# Patient Record
Sex: MALE | Race: WHITE | NOT HISPANIC OR LATINO | Employment: FULL TIME | URBAN - NONMETROPOLITAN AREA
[De-identification: names, ages, dates, MRNs, and addresses within clinical notes are randomized per-mention and may not be internally consistent; named-entity substitution may affect disease eponyms.]

---

## 2021-06-18 ENCOUNTER — HOSPITAL ENCOUNTER (EMERGENCY)
Facility: HOSPITAL | Age: 31
Discharge: HOME | End: 2021-06-18
Attending: EMERGENCY MEDICINE | Admitting: EMERGENCY MEDICINE

## 2021-06-18 VITALS
SYSTOLIC BLOOD PRESSURE: 142 MMHG | HEIGHT: 67 IN | RESPIRATION RATE: 18 BRPM | WEIGHT: 162 LBS | DIASTOLIC BLOOD PRESSURE: 79 MMHG | OXYGEN SATURATION: 95 % | HEART RATE: 95 BPM | TEMPERATURE: 98.2 F | BODY MASS INDEX: 25.43 KG/M2

## 2021-06-18 DIAGNOSIS — F10.920 ALCOHOLIC INTOXICATION WITHOUT COMPLICATION (HCC): Primary | ICD-10-CM

## 2021-06-18 LAB
ALBUMIN SERPL-MCNC: 5 G/DL (ref 3.5–5.2)
ALBUMIN/GLOB SERPL: 1.6 G/DL
ALP SERPL-CCNC: 84 U/L (ref 39–117)
ALT SERPL W P-5'-P-CCNC: 77 U/L (ref 1–41)
ANION GAP SERPL CALCULATED.3IONS-SCNC: 19 MMOL/L (ref 5–15)
AST SERPL-CCNC: 101 U/L (ref 1–40)
BASOPHILS # BLD AUTO: 0.08 10*3/MM3 (ref 0–0.2)
BASOPHILS NFR BLD AUTO: 1 % (ref 0–1.5)
BILIRUB SERPL-MCNC: 0.8 MG/DL (ref 0–1.2)
BUN SERPL-MCNC: 9 MG/DL (ref 6–20)
BUN/CREAT SERPL: 12.2 (ref 7–25)
CALCIUM SPEC-SCNC: 9.6 MG/DL (ref 8.6–10.5)
CHLORIDE SERPL-SCNC: 96 MMOL/L (ref 98–107)
CO2 SERPL-SCNC: 23 MMOL/L (ref 22–29)
CREAT SERPL-MCNC: 0.74 MG/DL (ref 0.76–1.27)
DEPRECATED RDW RBC AUTO: 40.8 FL (ref 37–54)
EOSINOPHIL # BLD AUTO: 0.01 10*3/MM3 (ref 0–0.4)
EOSINOPHIL NFR BLD AUTO: 0.1 % (ref 0.3–6.2)
ERYTHROCYTE [DISTWIDTH] IN BLOOD BY AUTOMATED COUNT: 13.2 % (ref 12.3–15.4)
ETHANOL UR QL: 0.26 %
GFR SERPL CREATININE-BSD FRML MDRD: 126 ML/MIN/1.73
GLOBULIN UR ELPH-MCNC: 3.1 GM/DL
GLUCOSE SERPL-MCNC: 102 MG/DL (ref 65–99)
HCT VFR BLD AUTO: 44.5 % (ref 37.5–51)
HGB BLD-MCNC: 15.4 G/DL (ref 13–17.7)
IMM GRANULOCYTES # BLD AUTO: 0.02 10*3/MM3 (ref 0–0.05)
IMM GRANULOCYTES NFR BLD AUTO: 0.2 % (ref 0–0.5)
LIPASE SERPL-CCNC: 23 U/L (ref 13–60)
LYMPHOCYTES # BLD AUTO: 2.12 10*3/MM3 (ref 0.7–3.1)
LYMPHOCYTES NFR BLD AUTO: 25.9 % (ref 19.6–45.3)
MCH RBC QN AUTO: 29.1 PG (ref 26.6–33)
MCHC RBC AUTO-ENTMCNC: 34.6 G/DL (ref 31.5–35.7)
MCV RBC AUTO: 84.1 FL (ref 79–97)
MONOCYTES # BLD AUTO: 0.67 10*3/MM3 (ref 0.1–0.9)
MONOCYTES NFR BLD AUTO: 8.2 % (ref 5–12)
NEUTROPHILS NFR BLD AUTO: 5.28 10*3/MM3 (ref 1.7–7)
NEUTROPHILS NFR BLD AUTO: 64.6 % (ref 42.7–76)
NRBC BLD AUTO-RTO: 0 /100 WBC (ref 0–0.2)
PLATELET # BLD AUTO: 332 10*3/MM3 (ref 140–450)
PMV BLD AUTO: 8.8 FL (ref 6–12)
POTASSIUM SERPL-SCNC: 3.5 MMOL/L (ref 3.5–5.2)
PROT SERPL-MCNC: 8.1 G/DL (ref 6–8.5)
RBC # BLD AUTO: 5.29 10*6/MM3 (ref 4.14–5.8)
SODIUM SERPL-SCNC: 138 MMOL/L (ref 136–145)
WBC # BLD AUTO: 8.18 10*3/MM3 (ref 3.4–10.8)

## 2021-06-18 PROCEDURE — 85025 COMPLETE CBC W/AUTO DIFF WBC: CPT | Performed by: EMERGENCY MEDICINE

## 2021-06-18 PROCEDURE — 80053 COMPREHEN METABOLIC PANEL: CPT | Performed by: EMERGENCY MEDICINE

## 2021-06-18 PROCEDURE — 25010000002 LORAZEPAM PER 2 MG: Performed by: EMERGENCY MEDICINE

## 2021-06-18 PROCEDURE — 96374 THER/PROPH/DIAG INJ IV PUSH: CPT

## 2021-06-18 PROCEDURE — 83690 ASSAY OF LIPASE: CPT | Performed by: EMERGENCY MEDICINE

## 2021-06-18 PROCEDURE — 99283 EMERGENCY DEPT VISIT LOW MDM: CPT

## 2021-06-18 PROCEDURE — 82077 ASSAY SPEC XCP UR&BREATH IA: CPT | Performed by: EMERGENCY MEDICINE

## 2021-06-18 RX ORDER — LORAZEPAM 2 MG/ML
1 INJECTION INTRAMUSCULAR ONCE
Status: COMPLETED | OUTPATIENT
Start: 2021-06-18 | End: 2021-06-18

## 2021-06-18 RX ORDER — SODIUM CHLORIDE 0.9 % (FLUSH) 0.9 %
10 SYRINGE (ML) INJECTION AS NEEDED
Status: DISCONTINUED | OUTPATIENT
Start: 2021-06-18 | End: 2021-06-19 | Stop reason: HOSPADM

## 2021-06-18 RX ADMIN — SODIUM CHLORIDE 1000 ML: 9 INJECTION, SOLUTION INTRAVENOUS at 22:06

## 2021-06-18 RX ADMIN — LORAZEPAM 1 MG: 2 INJECTION INTRAMUSCULAR; INTRAVENOUS at 22:27

## 2021-06-19 NOTE — ED PROVIDER NOTES
Subjective   Patient presents with a complaint that he thinks he may be going through withdrawal.  He says he has been an alcoholic for a long time and only drinks pretty much every day.  He says he can drink as much as 1/5 a day but normally drinks a little less than that.  However he is decided he needs to quit because he is no he is doing himself always been lying to his family.  They have finally convinced him to come in to get checked out and get treated.  He says his last drink was a shot subtype of whiskey about 4 hours prior to arrival here.  However when asked about that shot he says it is actually a double shot.  He says he is feeling very anxious and worried and does not want to die this disease of alcoholism.      History provided by:  Patient   used: No    Alcohol Intoxication  Location:  General  Quality:  Alcohol  Severity:  Moderate  Onset quality:  Gradual  Duration:  4 hours  Timing:  Constant  Progression:  Unchanged  Chronicity:  New  Associated symptoms: no abdominal pain, no chest pain, no congestion, no cough, no diarrhea, no ear pain, no fever, no headaches, no myalgias, no nausea, no rhinorrhea, no shortness of breath and no vomiting        Review of Systems   Constitutional: Negative.  Negative for fever.   HENT: Negative.  Negative for congestion, ear pain and rhinorrhea.    Respiratory: Negative.  Negative for cough and shortness of breath.    Cardiovascular: Negative.  Negative for chest pain.   Gastrointestinal: Negative.  Negative for abdominal pain, diarrhea, nausea and vomiting.   Genitourinary: Negative.    Musculoskeletal: Negative.  Negative for myalgias.   Skin: Negative.    Neurological: Negative.  Negative for headaches.   Psychiatric/Behavioral: Negative.    All other systems reviewed and are negative.      No past medical history on file.    No Known Allergies    No past surgical history on file.    No family history on file.    Social History      Socioeconomic History   • Marital status: Significant Other     Spouse name: Not on file   • Number of children: Not on file   • Years of education: Not on file   • Highest education level: Not on file           Objective   Physical Exam  Vitals and nursing note reviewed.   Constitutional:       Appearance: Normal appearance.   HENT:      Head: Normocephalic and atraumatic.      Mouth/Throat:      Mouth: Mucous membranes are moist.      Pharynx: Oropharynx is clear.   Eyes:      Extraocular Movements: Extraocular movements intact.      Pupils: Pupils are equal, round, and reactive to light.   Cardiovascular:      Rate and Rhythm: Regular rhythm. Tachycardia present.   Pulmonary:      Effort: Pulmonary effort is normal.      Breath sounds: Normal breath sounds.   Abdominal:      General: Abdomen is flat.      Palpations: Abdomen is soft.   Musculoskeletal:         General: Normal range of motion.      Cervical back: Normal range of motion and neck supple.   Skin:     General: Skin is warm and dry.   Neurological:      General: No focal deficit present.      Mental Status: He is alert and oriented to person, place, and time.      Comments: He has no signs of tremors or seizure activity.   Psychiatric:         Mood and Affect: Mood normal.         Behavior: Behavior normal.      Comments: Patient has no signs of visual auditory hallucinations.         Procedures           ED Course  ED Course as of Jun 19 0435   Sat Jun 19, 2021   0771 I told the patient his testing looks okay other than his liver enzymes are up a little bit but he will stop drinking that should correct himself.  I pointed out to him that his alcohol level was 0.257 at the present time and that was several hours after what he claimed his last drink of only a shot and then before that it was sometime in the morning.  I told him that he may be drinking more than he realizes or has admitted himself.  However there is no treatment program here to help  him with alcoholism and there is none anywhere nearby that I know of except for a program in [place].  He can check with him if he wants to an inpatient program or outpatient program.  I applauded him on his awareness that he needed to quit drinking and I have told him to follow-up with these people try to get some help.  He does say that his family has a good strong support and his wife is here with him so they can have them out also.  He is discharged in stable condition.  There are no signs of impending DTs especially without alcohol level.    [TR]      ED Course User Index  [TR] Corby Esqueda Jr., MD                                           MDM  Number of Diagnoses or Management Options  Alcoholic intoxication without complication (CMS/HCC): new and requires workup     Amount and/or Complexity of Data Reviewed  Clinical lab tests: ordered and reviewed  Tests in the radiology section of CPT®: ordered and reviewed  Tests in the medicine section of CPT®: ordered and reviewed    Risk of Complications, Morbidity, and/or Mortality  Presenting problems: moderate  Diagnostic procedures: moderate  Management options: moderate    Patient Progress  Patient progress: stable      Final diagnoses:   Alcoholic intoxication without complication (CMS/HCC)       ED Disposition  ED Disposition     ED Disposition Condition Comment    Discharge Stable           Provider, No Known  [facility] [address]  [phone]      If symptoms worsen         Medication List      No changes were made to your prescriptions during this visit.          Corby Esqueda Jr., MD  06/19/21 2955

## 2021-06-19 NOTE — ED NOTES
Patient presents to the ED with the CC of alcohol withdraw. Last drink @ 1607. He states that for the past month and half he has been drinking daily, all day. He states it is due to stress. He admits to smoke marijuana today to try to settle his nerves, denies any stimulant use.      Velma Holcomb, RN  06/18/21 2056

## 2022-03-29 ENCOUNTER — HOSPITAL ENCOUNTER (EMERGENCY)
Facility: HOSPITAL | Age: 32
Discharge: HOME | End: 2022-03-30
Attending: INTERNAL MEDICINE | Admitting: INTERNAL MEDICINE

## 2022-03-29 DIAGNOSIS — K92.0 HEMATEMESIS WITH NAUSEA: Primary | ICD-10-CM

## 2022-03-29 DIAGNOSIS — F10.920 ALCOHOLIC INTOXICATION WITHOUT COMPLICATION: ICD-10-CM

## 2022-03-29 PROCEDURE — 96374 THER/PROPH/DIAG INJ IV PUSH: CPT

## 2022-03-29 PROCEDURE — 99283 EMERGENCY DEPT VISIT LOW MDM: CPT

## 2022-03-29 PROCEDURE — 96361 HYDRATE IV INFUSION ADD-ON: CPT

## 2022-03-29 PROCEDURE — 82077 ASSAY SPEC XCP UR&BREATH IA: CPT | Performed by: INTERNAL MEDICINE

## 2022-03-29 PROCEDURE — 36415 COLL VENOUS BLD VENIPUNCTURE: CPT

## 2022-03-29 PROCEDURE — 85025 COMPLETE CBC W/AUTO DIFF WBC: CPT | Performed by: INTERNAL MEDICINE

## 2022-03-29 PROCEDURE — 80053 COMPREHEN METABOLIC PANEL: CPT | Performed by: INTERNAL MEDICINE

## 2022-03-29 RX ORDER — SODIUM CHLORIDE 0.9 % (FLUSH) 0.9 %
10 SYRINGE (ML) INJECTION AS NEEDED
Status: DISCONTINUED | OUTPATIENT
Start: 2022-03-29 | End: 2022-03-30 | Stop reason: HOSPADM

## 2022-03-29 RX ORDER — FAMOTIDINE 10 MG/ML
20 INJECTION, SOLUTION INTRAVENOUS ONCE
Status: COMPLETED | OUTPATIENT
Start: 2022-03-29 | End: 2022-03-30

## 2022-03-30 VITALS
OXYGEN SATURATION: 99 % | BODY MASS INDEX: 26.68 KG/M2 | RESPIRATION RATE: 18 BRPM | HEART RATE: 91 BPM | TEMPERATURE: 97.7 F | SYSTOLIC BLOOD PRESSURE: 123 MMHG | WEIGHT: 170 LBS | HEIGHT: 67 IN | DIASTOLIC BLOOD PRESSURE: 87 MMHG

## 2022-03-30 LAB
ALBUMIN SERPL-MCNC: 4.9 G/DL (ref 3.5–5.2)
ALBUMIN/GLOB SERPL: 1.7 G/DL
ALP SERPL-CCNC: 72 U/L (ref 39–117)
ALT SERPL W P-5'-P-CCNC: 22 U/L (ref 1–41)
ANION GAP SERPL CALCULATED.3IONS-SCNC: 12 MMOL/L (ref 5–15)
AST SERPL-CCNC: 26 U/L (ref 1–40)
BASOPHILS # BLD AUTO: 0.08 10*3/MM3 (ref 0–0.2)
BASOPHILS NFR BLD AUTO: 0.8 % (ref 0–1.5)
BILIRUB SERPL-MCNC: 0.3 MG/DL (ref 0–1.2)
BUN SERPL-MCNC: 13 MG/DL (ref 6–20)
BUN/CREAT SERPL: 16.5 (ref 7–25)
CALCIUM SPEC-SCNC: 9 MG/DL (ref 8.6–10.5)
CHLORIDE SERPL-SCNC: 99 MMOL/L (ref 98–107)
CO2 SERPL-SCNC: 27 MMOL/L (ref 22–29)
CREAT SERPL-MCNC: 0.79 MG/DL (ref 0.76–1.27)
DEPRECATED RDW RBC AUTO: 38.3 FL (ref 37–54)
EGFRCR SERPLBLD CKD-EPI 2021: 123.3 ML/MIN/1.73
EOSINOPHIL # BLD AUTO: 0.06 10*3/MM3 (ref 0–0.4)
EOSINOPHIL NFR BLD AUTO: 0.6 % (ref 0.3–6.2)
ERYTHROCYTE [DISTWIDTH] IN BLOOD BY AUTOMATED COUNT: 12.4 % (ref 12.3–15.4)
ETHANOL UR QL: 0.29 %
GLOBULIN UR ELPH-MCNC: 2.9 GM/DL
GLUCOSE SERPL-MCNC: 111 MG/DL (ref 65–99)
HCT VFR BLD AUTO: 46.5 % (ref 37.5–51)
HGB BLD-MCNC: 15.9 G/DL (ref 13–17.7)
HOLD SPECIMEN: NORMAL
HOLD SPECIMEN: NORMAL
IMM GRANULOCYTES # BLD AUTO: 0.07 10*3/MM3 (ref 0–0.05)
IMM GRANULOCYTES NFR BLD AUTO: 0.7 % (ref 0–0.5)
INR PPP: 1.05 (ref 0.91–1.09)
LYMPHOCYTES # BLD AUTO: 2.41 10*3/MM3 (ref 0.7–3.1)
LYMPHOCYTES NFR BLD AUTO: 23 % (ref 19.6–45.3)
MCH RBC QN AUTO: 29 PG (ref 26.6–33)
MCHC RBC AUTO-ENTMCNC: 34.2 G/DL (ref 31.5–35.7)
MCV RBC AUTO: 84.7 FL (ref 79–97)
MONOCYTES # BLD AUTO: 0.64 10*3/MM3 (ref 0.1–0.9)
MONOCYTES NFR BLD AUTO: 6.1 % (ref 5–12)
NEUTROPHILS NFR BLD AUTO: 68.8 % (ref 42.7–76)
NEUTROPHILS NFR BLD AUTO: 7.2 10*3/MM3 (ref 1.7–7)
NRBC BLD AUTO-RTO: 0 /100 WBC (ref 0–0.2)
PLATELET # BLD AUTO: 372 10*3/MM3 (ref 140–450)
PMV BLD AUTO: 9.4 FL (ref 6–12)
POTASSIUM SERPL-SCNC: 4.3 MMOL/L (ref 3.5–5.2)
PROT SERPL-MCNC: 7.8 G/DL (ref 6–8.5)
PROTHROMBIN TIME: 13.3 SECONDS (ref 11.9–14.6)
RBC # BLD AUTO: 5.49 10*6/MM3 (ref 4.14–5.8)
SODIUM SERPL-SCNC: 138 MMOL/L (ref 136–145)
WBC NRBC COR # BLD: 10.46 10*3/MM3 (ref 3.4–10.8)
WHOLE BLOOD HOLD SPECIMEN: NORMAL
WHOLE BLOOD HOLD SPECIMEN: NORMAL

## 2022-03-30 PROCEDURE — 96374 THER/PROPH/DIAG INJ IV PUSH: CPT

## 2022-03-30 PROCEDURE — 96361 HYDRATE IV INFUSION ADD-ON: CPT

## 2022-03-30 PROCEDURE — 85610 PROTHROMBIN TIME: CPT | Performed by: INTERNAL MEDICINE

## 2022-03-30 RX ADMIN — SODIUM CHLORIDE 2313 ML: 9 INJECTION, SOLUTION INTRAVENOUS at 00:27

## 2022-03-30 RX ADMIN — FAMOTIDINE 20 MG: 10 INJECTION INTRAVENOUS at 00:29

## 2022-04-12 ENCOUNTER — OFFICE VISIT (OUTPATIENT)
Dept: FAMILY MEDICINE CLINIC | Facility: CLINIC | Age: 32
End: 2022-04-12

## 2022-04-12 VITALS
WEIGHT: 179 LBS | BODY MASS INDEX: 28.09 KG/M2 | SYSTOLIC BLOOD PRESSURE: 130 MMHG | DIASTOLIC BLOOD PRESSURE: 80 MMHG | OXYGEN SATURATION: 100 % | HEART RATE: 90 BPM | TEMPERATURE: 98 F | HEIGHT: 67 IN

## 2022-04-12 DIAGNOSIS — B37.0 THRUSH: ICD-10-CM

## 2022-04-12 DIAGNOSIS — F10.10 ALCOHOL ABUSE: ICD-10-CM

## 2022-04-12 DIAGNOSIS — K21.9 GASTROESOPHAGEAL REFLUX DISEASE, UNSPECIFIED WHETHER ESOPHAGITIS PRESENT: Primary | ICD-10-CM

## 2022-04-12 DIAGNOSIS — F41.1 GAD (GENERALIZED ANXIETY DISORDER): ICD-10-CM

## 2022-04-12 PROCEDURE — 99204 OFFICE O/P NEW MOD 45 MIN: CPT | Performed by: FAMILY MEDICINE

## 2022-04-12 RX ORDER — OMEPRAZOLE 40 MG/1
40 CAPSULE, DELAYED RELEASE ORAL DAILY
Qty: 90 CAPSULE | Refills: 1 | Status: SHIPPED | OUTPATIENT
Start: 2022-04-12 | End: 2022-11-04

## 2022-04-12 RX ORDER — FLUOXETINE HYDROCHLORIDE 20 MG/1
20 CAPSULE ORAL DAILY
Qty: 30 CAPSULE | Refills: 1 | Status: SHIPPED | OUTPATIENT
Start: 2022-04-12 | End: 2022-05-23

## 2022-04-12 NOTE — PROGRESS NOTES
"Chief Complaint  Establish Care ([facility] ED 3/29/22, dx: hematemesis), Alcohol Problem (Wanting to discuss ), and Anxiety (ARMIDA-7= 8, PHQ-1 )    Subjective          Gui Rivera presents to [facility]  History of Present Illness  No further hematemesis since ED visit above  Struggles with GERD  Hx ETOH abuse, now does not drink daily, will drink 4-5 drinks at social outings  Denies daily withdraws  Struggles with mild underlying anxiety, some depression on weekends  ROS otherwise neg    Objective   Vital Signs:   /80 (BP Location: Left arm, Patient Position: Sitting, Cuff Size: Adult)   Pulse 90   Temp 98 °F (36.7 °C) (Temporal)   Ht 170.2 cm (67\")   Wt 81.2 kg (179 lb)   SpO2 100%   BMI 28.04 kg/m²            Physical Exam  Vitals and nursing note reviewed.   Constitutional:       General: He is not in acute distress.     Appearance: He is not diaphoretic.   HENT:      Head: Normocephalic and atraumatic.      Nose: Nose normal.   Eyes:      General: No scleral icterus.        Right eye: No discharge.         Left eye: No discharge.      Conjunctiva/sclera: Conjunctivae normal.   Neck:      Trachea: No tracheal deviation.   Pulmonary:      Effort: Pulmonary effort is normal.   Skin:     General: Skin is warm and dry.      Coloration: Skin is not pale.   Neurological:      Mental Status: He is alert and oriented to person, place, and time.   Psychiatric:         Behavior: Behavior normal.         Thought Content: Thought content normal.         Judgment: Judgment normal.      Comments: Nervous without ETOH tremor        Result Review :                 Assessment and Plan    Diagnoses and all orders for this visit:    1. Gastroesophageal reflux disease, unspecified whether esophagitis present (Primary)  -     omeprazole (priLOSEC) 40 MG capsule; Take 1 capsule by mouth Daily.  Dispense: 90 capsule; Refill: 1    2. Thrush  -     nystatin (MYCOSTATIN) 100,000 unit/mL suspension; " Swish and swallow 5 mL 4 (Four) Times a Day.  Dispense: 473 mL; Refill: 0    3. Alcohol abuse    4. ARMIDA (generalized anxiety disorder)  -     FLUoxetine (PROzac) 20 MG capsule; Take 1 capsule by mouth Daily.  Dispense: 30 capsule; Refill: 1    consider counseling at f/u    Follow Up   Return in about 6 weeks (around 5/24/2022).  Patient was given instructions and counseling regarding his condition or for health maintenance advice. Please see specific information pulled into the AVS if appropriate.

## 2022-04-13 ENCOUNTER — PATIENT ROUNDING (BHMG ONLY) (OUTPATIENT)
Dept: FAMILY MEDICINE CLINIC | Facility: CLINIC | Age: 32
End: 2022-04-13

## 2022-04-13 NOTE — PROGRESS NOTES
April 13, 2022    Hello, may I speak with Gui Rivera?    My name is Liv    I am  with [facility]  [address] [place] [id number] [phone].    Before we get started may I verify your date of birth? 1990    I am calling to officially welcome you to our practice and ask about your recent visit. Is this a good time to talk? yes    Tell me about your visit with us. What things went well?  helpful calm his nerves/anxiety       We're always looking for ways to make our patients' experiences even better. Do you have recommendations on ways we may improve?  no    Overall were you satisfied with your first visit to our practice? yes       I appreciate you taking the time to speak with me today. Is there anything else I can do for you? no      Thank you, and have a great day.

## 2022-05-06 ENCOUNTER — NURSE TRIAGE (OUTPATIENT)
Dept: CALL CENTER | Facility: HOSPITAL | Age: 32
End: 2022-05-06

## 2022-05-07 NOTE — TELEPHONE ENCOUNTER
Reason for Disposition  • Caller has medicine question, adult has minor symptoms, caller declines triage, AND triager answers question    Additional Information  • Negative: [1] Intentional drug overdose AND [2] suicidal thoughts or ideas  • Negative: Drug overdose and triager unable to answer question  • Negative: Caller requesting information unrelated to medicine  • Negative: Caller requesting information about COVID-19 Vaccine  • Negative: Caller requesting information about Emergency Contraception  • Negative: Caller requesting information about Combined Birth Control Pills  • Negative: Caller requesting information about Progestin Birth Control Pills  • Negative: Caller requesting information about Post-Op pain or medicines  • Negative: Caller requesting a prescription antibiotic (such as Penicillin) for Strep throat and has a positive culture result  • Negative: Caller requesting a prescription anti-viral med (such as Tamiflu) and has influenza (flu)  symptoms  • Negative: Immunization reaction suspected  • Negative: Rash while taking a medicine or within 3 days of stopping it  • Negative: [1] Asthma and [2] having symptoms of asthma (cough, wheezing, etc.)  • Negative: [1] Symptom of illness (e.g., headache, abdominal pain, earache, vomiting) AND [2] more than mild  • Negative: Breastfeeding questions about mother's medicines and diet  • Negative: MORE THAN A DOUBLE DOSE of a prescription or over-the-counter (OTC) drug  • Negative: [1] DOUBLE DOSE (an extra dose or lesser amount) of prescription drug AND [2] any symptoms (e.g., dizziness, nausea, pain, sleepiness)  • Negative: [1] DOUBLE DOSE (an extra dose or lesser amount) of over-the-counter (OTC) drug AND [2] any symptoms (e.g., dizziness, nausea, pain, sleepiness)  • Negative: Took another person's prescription drug  • Negative: [1] DOUBLE DOSE (an extra dose or lesser amount) of prescription drug AND [2] NO symptoms (Exception: a double dose of  antibiotics)  • Negative: Diabetes drug error or overdose (e.g., took wrong type of insulin or took extra dose)  • Negative: [1] Prescription refill request for ESSENTIAL medicine (i.e., likelihood of harm to patient if not taken) AND [2] triager unable to refill per department policy  • Negative: [1] Prescription not at pharmacy AND [2] was prescribed by PCP recently (Exception: triager has access to EMR and prescription is recorded there. Go to Home Care and confirm for pharmacy.)  • Negative: [1] Pharmacy calling with prescription question AND [2] triager unable to answer question  • Negative: [1] Caller has URGENT medicine question about med that PCP or specialist prescribed AND [2] triager unable to answer question  • Negative: Medicine patch causing local rash or itching  • Negative: [1] Caller has medicine question about med NOT prescribed by PCP AND [2] triager unable to answer question (e.g., compatibility with other med, storage)  • Negative: Prescription request for new medicine (not a refill)  • Negative: Prescription refill request for a CONTROLLED substance (e.g., narcotics, ADHD medicines)  • Negative: [1] Prescription refill request for NON-ESSENTIAL medicine (i.e., no harm to patient if med not taken) AND [2] triager unable to refill per department policy  • Negative: [1] Caller has NON-URGENT medicine question about med that PCP prescribed AND [2] triager unable to answer question  • Negative: Caller wants to use a complementary or alternative medicine  • Negative: [1] Prescription prescribed recently is not at pharmacy AND [2] triager has access to patient's EMR AND [3] prescription is recorded in the EMR  • Negative: [1] DOUBLE DOSE (an extra dose or lesser amount) of over-the-counter (OTC) drug AND [2] NO symptoms  • Negative: [1] DOUBLE DOSE (an extra dose or lesser amount) of antibiotic drug AND [2] NO symptoms  • Negative: Caller has medicine question only, adult not sick, AND triager answers  "question    Answer Assessment - Initial Assessment Questions  1. NAME of MEDICATION: \"What medicine are you calling about?\"      Prozac and Protonix.   2. QUESTION: \"What is your question?\" (e.g., medication refill, side effect)      He forgot to take this morning and asking can he take now.  Advised yes.    3. PRESCRIBING HCP: \"Who prescribed it?\" Reason: if prescribed by specialist, call should be referred to that group.      Alberto Stephens,    4. SYMPTOMS: \"Do you have any symptoms?\"      No   5. SEVERITY: If symptoms are present, ask \"Are they mild, moderate or severe?\"      non  6. PREGNANCY:  \"Is there any chance that you are pregnant?\" \"When was your last menstrual period?\"      Male    Protocols used: MEDICATION QUESTION CALL-ADULT-AH      "

## 2022-05-10 ENCOUNTER — NURSE TRIAGE (OUTPATIENT)
Dept: CALL CENTER | Facility: HOSPITAL | Age: 32
End: 2022-05-10

## 2022-05-23 ENCOUNTER — OFFICE VISIT (OUTPATIENT)
Dept: FAMILY MEDICINE CLINIC | Facility: CLINIC | Age: 32
End: 2022-05-23

## 2022-05-23 VITALS
SYSTOLIC BLOOD PRESSURE: 148 MMHG | DIASTOLIC BLOOD PRESSURE: 86 MMHG | HEART RATE: 107 BPM | OXYGEN SATURATION: 98 % | HEIGHT: 66 IN | BODY MASS INDEX: 28.03 KG/M2 | WEIGHT: 174.4 LBS | TEMPERATURE: 97 F

## 2022-05-23 DIAGNOSIS — B37.0 THRUSH: ICD-10-CM

## 2022-05-23 DIAGNOSIS — F41.1 GAD (GENERALIZED ANXIETY DISORDER): Primary | ICD-10-CM

## 2022-05-23 PROCEDURE — 99214 OFFICE O/P EST MOD 30 MIN: CPT | Performed by: FAMILY MEDICINE

## 2022-05-23 RX ORDER — BUPROPION HYDROCHLORIDE 150 MG/1
150 TABLET ORAL EVERY MORNING
Qty: 30 TABLET | Refills: 1 | Status: SHIPPED | OUTPATIENT
Start: 2022-05-23 | End: 2022-07-26 | Stop reason: SDUPTHER

## 2022-05-23 RX ORDER — FLUCONAZOLE 150 MG/1
150 TABLET ORAL ONCE
Qty: 1 TABLET | Refills: 1 | Status: SHIPPED | OUTPATIENT
Start: 2022-05-23 | End: 2022-05-23

## 2022-05-23 NOTE — PROGRESS NOTES
"Chief Complaint  Follow-up and Anxiety (Discontinued prozac due to side effects, wanting to discuss other options )    Subjective          Gui Rivera presents to [facility]  History of Present Illness  Had to stop Prozac due to agitation, and when he was experiencing anxiety he felt like it was worse with increased severity.  Symptoms have improved with cessation of medication, but baseline anxiety is still persistent.    Feels like he still has thrush from previous visit despite use of nystatin suspension.    Objective   Vital Signs:  /86 (BP Location: Left arm, Patient Position: Sitting, Cuff Size: Adult)   Pulse 107   Temp 97 °F (36.1 °C) (Temporal)   Ht 167.6 cm (66\")   Wt 79.1 kg (174 lb 6.4 oz)   SpO2 98%   BMI 28.15 kg/m²         Physical Exam  Vitals and nursing note reviewed.   Constitutional:       General: He is not in acute distress.     Appearance: He is not diaphoretic.   HENT:      Head: Normocephalic and atraumatic.      Nose: Nose normal.   Eyes:      General: No scleral icterus.        Right eye: No discharge.         Left eye: No discharge.      Conjunctiva/sclera: Conjunctivae normal.   Neck:      Trachea: No tracheal deviation.   Pulmonary:      Effort: Pulmonary effort is normal.   Skin:     General: Skin is warm and dry.      Coloration: Skin is not pale.      Comments: Mild thrush   Neurological:      Mental Status: He is alert and oriented to person, place, and time.   Psychiatric:         Behavior: Behavior normal.         Thought Content: Thought content normal.         Judgment: Judgment normal.      Comments: Anxious        Result Review :                 Assessment and Plan    Diagnoses and all orders for this visit:    1. ARMIDA (generalized anxiety disorder) (Primary)  -     buPROPion XL (Wellbutrin XL) 150 MG 24 hr tablet; Take 1 tablet by mouth Every Morning.  Dispense: 30 tablet; Refill: 1    2. Thrush  -     fluconazole (Diflucan) 150 " MG tablet; Take 1 tablet by mouth 1 (One) Time for 1 dose.  Dispense: 1 tablet; Refill: 1             Follow Up   Return in about 6 weeks (around 7/4/2022).  Patient was given instructions and counseling regarding his condition or for health maintenance advice. Please see specific information pulled into the AVS if appropriate.

## 2022-06-08 ENCOUNTER — NURSE TRIAGE (OUTPATIENT)
Dept: CALL CENTER | Facility: HOSPITAL | Age: 32
End: 2022-06-08

## 2022-06-08 ENCOUNTER — TELEPHONE (OUTPATIENT)
Dept: FAMILY MEDICINE CLINIC | Facility: CLINIC | Age: 32
End: 2022-06-08

## 2022-06-08 DIAGNOSIS — F10.20 ALCOHOLISM: Primary | ICD-10-CM

## 2022-06-08 NOTE — TELEPHONE ENCOUNTER
"Lengthy call with caller who reports he is an alcoholic and is wishing to stop drinking but is afraid of delirium tremens.  last drink was 15 minutes ago. Caller states when closes his eyes he hallucinates but he stops when opens eyes. has gone to ER with beginning of delirium tremens and that he was sent home.  had a friend who recently  while detoxing and that is why he continues to drink. Caller asking \"what do I need to do?\"  is to be at work in 1.5 hours. 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  Caller lives with wife and she is at home. Phone number given for Addiction hotline and also encouraged caller to call PCP as soon as office opens.Caller verbalized understanding.  Reason for Disposition  • Requesting admission for alcohol abuse    Additional Information  • Negative: Coma (e.g., not moving, not talking, not responding to stimuli)  • Negative: Difficult to awaken or acting confused (e.g., disoriented, slurred speech)  • Negative: Seeing, hearing, or feeling things that are not there (i.e., visual, auditory, or tactile hallucinations)  • Negative: Slow, shallow and weak breathing  • Negative: Seizure  • Negative: Violent behavior, or threatening to physically hurt or kill someone  • Negative: Patient attempted suicide  • Negative: Threatening suicide  • Negative: Sounds like a life-threatening emergency to the triager  • Negative: Recent significant injury, see that guideline (e.g., head, neck, chest, abdominal or extremity  injury)  • Negative: Substance abuse or dependence: question or problem related to  • Negative: Depression is main problem or symptom (e.g., feelings of sadness or hopelessness)  • Negative: SEVERE abdominal pain  • Negative: [1] Constant abdominal pain AND [2] present > 2 hours  • Negative: Blood in bowel movements (e.g., black, tarry or red " "blood)  (Exception: Blood on surface of BM with constipation)  • Negative: [1] Vomiting AND [2] contains red blood or black (\"coffee ground\") material  (Exception: few red streaks in vomit that only happened once)  • Negative: [1] Vomiting or dry heaves AND [2] occurring frequently (i.e., vomiting > 4 times in last 4 hours)  • Negative: Feeling very shaky (i.e., visible tremors of hands)  • Negative: Patient sounds very sick or weak to the triager  • Negative: White of the eyes have turned yellow (i.e., jaundice)  • Negative: Fever > 101.5 F (38.6 C)  • Negative: [1] Drinks alcohol daily AND [2] prior alcohol withdrawal seizures  • Negative: [1] Drinks alcohol daily AND [2] prior DTs (delirium tremens)    Answer Assessment - Initial Assessment Questions  1. DO YOU DRINK: \"Do you drink alcohol, including beer, wine or hard liquor?\"      Hard liquor  2. HOW OFTEN: \"How many days per week do you typically drink alcohol?\"      Daily but has been trying to quit. Did not drink for 3-4 days  3. HOW MUCH: \"How many drinks do you typically have on days when you drink?\" (1.5 oz hard liquor [one shot or jigger; 45 ml], 5 oz wine [small glass; 150 ml], 12 oz beer [one can; 360 ml])        4. MOST: \"What is the most that you have had to drink on any one occasion in the last month?\"      Unknown  5. LAST 24 HOURS: \"Have you had a drink within the last 24 hours?\"      Yes  6. DRINKING PROBLEM: \"Do you have or have you ever had a drinking problem?\"      \"I'm an alcoholic\"  7. DRUG PROBLEM: \"Are you using any other drugs?\" (e.g., yes/no; cocaine, prescription medications, etc.)      No  8. SYMPTOMS: \"What symptoms are you currently experiencing?\" (e.g., none, tremors or shakiness, abdominal pain, vomiting, blackout spells)      When closes eyes, states he hallucinates. When opens eyes, states he is fine  9. DETOX PROGRAM: \"Have you ever gone through a detox program?\"      No.  10. THERAPIST: \"Do you have a counselor or therapist? " "Name?\"        No  11. SUPPORT: \"Who is with you now?\" \"Who do you live with?\" \"Do you have family or friends nearby who you can talk to?\" \"Are you a member of Alcoholics Anonymous?\"        Lives with wife  12. PREGNANCY: \"Is there any chance you are pregnant?\" \"When was your last menstrual period?\"        N/A    Protocols used: ALCOHOL ABUSE AND DEPENDENCE-ADULT-AH    "

## 2022-06-08 NOTE — TELEPHONE ENCOUNTER
Contacted Dr. Stephens, he requests that patient remain on hospital campus while awaiting treatment plan.   Patient informed, voiced understanding and will remain on [facility] campus.

## 2022-06-08 NOTE — TELEPHONE ENCOUNTER
Caller: Gui Rivera    Relationship: Self    Best call back number: [phone]        Who are you requesting to speak with (clinical staff, provider,  specific staff member): DR RAJPUT    Do you know the name of the person who called: PATIENT    What was the call regarding: PLEASE CALL LATER IN THE DAY--TRYING TO GET BACK ON THE WAGON    Do you require a callback: YES

## 2022-06-09 NOTE — TELEPHONE ENCOUNTER
Patient was giving number and told to go to [facility] for detox. Patient stated he was on his way

## 2022-07-05 ENCOUNTER — TELEPHONE (OUTPATIENT)
Dept: FAMILY MEDICINE CLINIC | Facility: CLINIC | Age: 32
End: 2022-07-05

## 2022-07-26 ENCOUNTER — OFFICE VISIT (OUTPATIENT)
Dept: FAMILY MEDICINE CLINIC | Facility: CLINIC | Age: 32
End: 2022-07-26

## 2022-07-26 VITALS
TEMPERATURE: 97.4 F | HEART RATE: 96 BPM | HEIGHT: 66 IN | SYSTOLIC BLOOD PRESSURE: 121 MMHG | DIASTOLIC BLOOD PRESSURE: 76 MMHG | BODY MASS INDEX: 29.83 KG/M2 | OXYGEN SATURATION: 97 % | WEIGHT: 185.6 LBS

## 2022-07-26 DIAGNOSIS — F41.1 GAD (GENERALIZED ANXIETY DISORDER): Primary | ICD-10-CM

## 2022-07-26 DIAGNOSIS — F10.20 ALCOHOLISM: ICD-10-CM

## 2022-07-26 DIAGNOSIS — M72.2 PLANTAR FASCIAL FIBROMATOSIS OF LEFT FOOT: ICD-10-CM

## 2022-07-26 PROCEDURE — 99214 OFFICE O/P EST MOD 30 MIN: CPT | Performed by: FAMILY MEDICINE

## 2022-07-26 RX ORDER — HYDROXYZINE PAMOATE 25 MG/1
25 CAPSULE ORAL 3 TIMES DAILY PRN
Qty: 90 CAPSULE | Refills: 0 | Status: SHIPPED | OUTPATIENT
Start: 2022-07-26 | End: 2022-11-03 | Stop reason: SDUPTHER

## 2022-07-26 RX ORDER — TRAZODONE HYDROCHLORIDE 50 MG/1
50 TABLET ORAL NIGHTLY
Qty: 90 TABLET | Refills: 0 | Status: SHIPPED | OUTPATIENT
Start: 2022-07-26 | End: 2022-09-01 | Stop reason: SDUPTHER

## 2022-07-26 RX ORDER — TRAZODONE HYDROCHLORIDE 50 MG/1
TABLET ORAL
COMMUNITY
Start: 2022-06-14 | End: 2022-07-26 | Stop reason: SDUPTHER

## 2022-07-26 RX ORDER — HYDROXYZINE PAMOATE 25 MG/1
CAPSULE ORAL
COMMUNITY
Start: 2022-07-08 | End: 2022-07-26 | Stop reason: SDUPTHER

## 2022-07-26 RX ORDER — BUPROPION HYDROCHLORIDE 150 MG/1
150 TABLET ORAL EVERY MORNING
Qty: 90 TABLET | Refills: 0 | Status: SHIPPED | OUTPATIENT
Start: 2022-07-26 | End: 2022-09-01 | Stop reason: SDUPTHER

## 2022-08-02 ENCOUNTER — PATIENT MESSAGE (OUTPATIENT)
Dept: FAMILY MEDICINE CLINIC | Facility: CLINIC | Age: 32
End: 2022-08-02

## 2022-08-02 DIAGNOSIS — B37.0 THRUSH: Primary | ICD-10-CM

## 2022-08-08 RX ORDER — FLUCONAZOLE 150 MG/1
150 TABLET ORAL ONCE
Qty: 1 TABLET | Refills: 0 | Status: SHIPPED | OUTPATIENT
Start: 2022-08-08 | End: 2022-08-08

## 2022-08-08 NOTE — TELEPHONE ENCOUNTER
From: Gui Rivera  To: Alberto Stephens DO  Sent: 8/2/2022 6:34 PM CDT  Subject: Oral Antifungal    My oral antifungal medicine got lost while I was gone before I could take it, was wondering if that could get requested from [facility]

## 2022-09-01 ENCOUNTER — TELEMEDICINE (OUTPATIENT)
Dept: PSYCHIATRY | Facility: CLINIC | Age: 32
End: 2022-09-01

## 2022-09-01 DIAGNOSIS — F41.1 GAD (GENERALIZED ANXIETY DISORDER): ICD-10-CM

## 2022-09-01 DIAGNOSIS — F10.21 CHRONIC ALCOHOLISM IN REMISSION: Primary | ICD-10-CM

## 2022-09-01 PROCEDURE — 90792 PSYCH DIAG EVAL W/MED SRVCS: CPT | Performed by: NURSE PRACTITIONER

## 2022-09-01 RX ORDER — TRAZODONE HYDROCHLORIDE 50 MG/1
50 TABLET ORAL NIGHTLY
Qty: 90 TABLET | Refills: 0 | Status: SHIPPED | OUTPATIENT
Start: 2022-09-01 | End: 2022-11-03 | Stop reason: SDUPTHER

## 2022-09-01 RX ORDER — BUPROPION HYDROCHLORIDE 150 MG/1
150 TABLET ORAL EVERY MORNING
Qty: 90 TABLET | Refills: 0 | Status: SHIPPED | OUTPATIENT
Start: 2022-09-01 | End: 2022-11-03 | Stop reason: SDUPTHER

## 2022-09-01 NOTE — PROGRESS NOTES
"This provider is located at [facility], [address] [place] [address], [facility], [id number] using a secure MyChart Video Visit through [employer]. Patient is being seen remotely via telehealth at their home address in [place], and stated they are in a secure environment for this session. The patient's condition being diagnosed/treated is appropriate for telemedicine. The provider identified herself as well as her credentials.   The patient, and/or patients guardian, consent to be seen remotely, and when consent is given they understand that the consent allows for patient identifiable information to be sent to a third party as needed.   They may refuse to be seen remotely at any time. The electronic data is encrypted and password protected, and the patient and/or guardian has been advised of the potential risks to privacy not withstanding such measures.   PT Identifiers used: Name and .    You have chosen to receive care through a telehealth visit.  Do you consent to use a video/audio connection for your medical care today? Yes      Subjective   Gui Rivera is a 31 y.o. male who presents today for initial evaluation     Chief Complaint: \"I am an alcoholic, I have been sober 3 months, current medicines are doing well for me I needed somebody to continue medication management\"    History of Present Illness:    Patient reported from his home, reporting above chief complaint.  States recently completing rehab at [facility] in [facility].  Reports sobriety for nearly 3 months now.  Reports history of being treated for attention deficit as a child, but reports medications made him feel like a zombie.  States he is currently on medication for anxiety and depression and feels like his symptoms are well maintained with current medication regime.  PHQ-9 today was scored at 1, ARMIDA-7 was scored at 2.       The following portions of the patient's history were reviewed and updated as appropriate: allergies, current " "medications, past family history, past medical history, past social history, past surgical history and problem list.    Past Psychiatric History:  Patient reports being diagnosed with attention deficit when he was a child and was prescribed Adderall and Vyvanse but he did not take the medicines consistently because they made him \"feel like a zombie.\"  History of substance abuse, sobriety as of September 1, 2022 for 3 months.  1 admission for rehab and [facility].  Denies any history of suicide attempt, no history of self-harm behaviors.  Reports he was trialed on Prozac and had abnormal response to this, made him more aggressive.  Currently on Wellbutrin 150, trazodone 50 at night, hydroxyzine 25 as needed.  Feels like his mood is very stable at this point.      Past Medical History:  Past Medical History:   Diagnosis Date   • Alcohol abuse    • Anxiety    • Depression    • Wrist fracture     right       Substance Abuse History:   Types:Patient have extensive history of substance abuse, reports sobriety as of 9/1/2022 at approximately 3 months.  Attending AA, he has a sponsor back in [place] from the rehab, and he has a temporary sponsor locally.  Reports history of heavy alcohol use, and also used many other drugs in the past including cocaine, marijuana, benzodiazepines.  Withdrawal Symptoms:Denied any history of withdrawal seizures, but reports was having hallucinations with the last time that he heavily drank alcohol.      Social History:  Social History     Socioeconomic History   • Marital status:    • Number of children: 0   • Highest education level: Some college, no degree   Tobacco Use   • Smoking status: Former Smoker     Types: Cigarettes   • Smokeless tobacco: Never Used   Vaping Use   • Vaping Use: Every day   • Substances: Nicotine   Substance and Sexual Activity   • Alcohol use: Not Currently     Comment: previously heavy use   • Drug use: Not Currently     " Frequency: 7.0 times per week     Types: Marijuana, Benzodiazepines, Cocaine(coke), MDMA (ecstacy)   • Sexual activity: Yes   Patient reported good support system with his wife and sponsors.  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  Denies any history of legal problems, denies any history of  service.  Endorses [employer] spiritual beliefs.    Family History:  Family History   Problem Relation Age of Onset   • Alcohol abuse Maternal Uncle    • Alcohol abuse Maternal Grandmother    • Heart attack Maternal Grandmother    • Diabetes Paternal Grandmother    • Alcohol abuse Cousin        Past Surgical History:  History reviewed. No pertinent surgical history.    Problem List:  Patient Active Problem List   Diagnosis   • Gastroesophageal reflux disease   • Thrush   • Alcohol abuse       Allergy:   Allergies   Allergen Reactions   • Prozac [Fluoxetine] Other (See Comments)     agitation        Current Medications:   Current Outpatient Medications   Medication Sig Dispense Refill   • buPROPion XL (Wellbutrin XL) 150 MG 24 hr tablet Take 1 tablet by mouth Every Morning. 90 tablet 0   • hydrOXYzine pamoate (VISTARIL) 25 MG capsule Take 1 capsule by mouth 3 (Three) Times a Day As Needed for Anxiety. 90 capsule 0   • omeprazole (priLOSEC) 40 MG capsule Take 1 capsule by mouth Daily. 90 capsule 1   • traZODone (DESYREL) 50 MG tablet Take 1 tablet by mouth Every Night. 90 tablet 0   • nystatin (MYCOSTATIN) 100,000 unit/mL suspension Swish and swallow 5 mL 4 (Four) Times a Day. 473 mL 0     No current facility-administered medications for this visit.       Review of Systems:    Review of Systems   Psychiatric/Behavioral: Positive for positive for hyperactivity.   All other systems reviewed and are negative.        Physical Exam:   Physical Exam  Vitals reviewed.   Constitutional:       Appearance: Normal appearance.   HENT:      Head: Normocephalic.   Neurological:      Mental  Status: He is alert.   Psychiatric:         Attention and Perception: Attention and perception normal.         Mood and Affect: Mood and affect normal.         Behavior: Behavior is hyperactive. Behavior is cooperative.         Thought Content: Thought content normal.         Cognition and Memory: Cognition and memory normal.         Judgment: Judgment normal.      Comments: Speech is rapid but not pressured         Vitals:  There were no vitals taken for this visit. There is no height or weight on file to calculate BMI.  Due to extenuating circumstances and possible current health risks associated with the patient being present in a clinical setting (with current health restrictions in place in regards to possible COVID 19 transmission/exposure), the patient was seen remotely today via a MyChart Video Visit through [facility] and telephone encounter.  Unable to obtain vital signs due to nature of remote visit.  Height stated at 66 inches.  Weight stated at 163 pounds.    Last 3 Blood Pressure Readings:  BP Readings from Last 3 Encounters:   07/26/22 121/76   05/23/22 148/86   05/10/22 129/87       PHQ-9 Score:   PHQ-9 Total Score: (P) 1  PHQ-9 Depression Screening  Little interest or pleasure in doing things? (P) 0   Feeling down, depressed, or hopeless? (P) 0   Trouble falling or staying asleep, or sleeping too much? (P) 0   Feeling tired or having little energy? (P) 0   Poor appetite or overeating? (P) 0   Feeling bad about yourself - or that you are a failure or have let yourself or your family down? (P) 0   Trouble concentrating on things, such as reading the newspaper or watching television? (P) 1   Moving or speaking so slowly that other people could have noticed? Or the opposite - being so fidgety or restless that you have been moving around a lot more than usual? (P) 0   Thoughts that you would be better off dead, or of hurting yourself in some way? (P) 0   PHQ-9 Total Score (P) 1   If you checked off any  problems, how difficult have these problems made it for you to do your work, take care of things at home, or get along with other people? (P) Not difficult at all     PHQ-9 Total Score: (P) 1      Feeling nervous, anxious or on edge: (P) Not at all  Not being able to stop or control worrying: (P) Not at all  Worrying too much about different things: (P) Several days  Trouble Relaxing: (P) Not at all  Being so restless that it is hard to sit still: (P) Not at all  Feeling afraid as if something awful might happen: (P) Not at all  Becoming easily annoyed or irritable: (P) Several days  ARMIDA 7 Total Score: (P) 2  If you checked any problems, how difficult have these problems made it for you to do your work, take care of things at home, or get along with other people: (P) Not difficult at all      PROMIS scale screening tool that patient filled out virtually reviewed by this APRN at today's encounter.      Mental Status Exam:   Hygiene:   good  Cooperation:  Cooperative  Eye Contact:  Good  Psychomotor Behavior:  Hyperactive  Affect:  Full range  Mood: normal  Hopelessness: Denies  Speech:  Rapid  Thought Process:  Goal directed and Linear  Thought Content:  Normal  Suicidal:  None  Homicidal:  None  Hallucinations:  None  Delusion:  None  Memory:  Intact  Orientation:  Person, Place, Time and Situation  Reliability:  good  Insight:  Good  Judgement:  Good  Impulse Control:  Good  Physical/Medical Issues:  No        Lab Results:   Admission on 03/29/2022, Discharged on 03/30/2022   Component Date Value Ref Range Status   • Extra Tube 03/29/2022 Hold for add-ons.   Final    Auto resulted.   • Extra Tube 03/29/2022 hold for add-on   Final    Auto resulted   • Extra Tube 03/29/2022 Hold for add-ons.   Final    Auto resulted.   • Extra Tube 03/29/2022 hold for add-on   Final    Auto resulted   • Glucose 03/29/2022 111 (A) 65 - 99 mg/dL Final   • BUN 03/29/2022 13  6 - 20 mg/dL Final   • Creatinine 03/29/2022 0.79  0.76 -  1.27 mg/dL Final   • Sodium 03/29/2022 138  136 - 145 mmol/L Final   • Potassium 03/29/2022 4.3  3.5 - 5.2 mmol/L Final   • Chloride 03/29/2022 99  98 - 107 mmol/L Final   • CO2 03/29/2022 27.0  22.0 - 29.0 mmol/L Final   • Calcium 03/29/2022 9.0  8.6 - 10.5 mg/dL Final   • Total Protein 03/29/2022 7.8  6.0 - 8.5 g/dL Final   • Albumin 03/29/2022 4.90  3.50 - 5.20 g/dL Final   • ALT (SGPT) 03/29/2022 22  1 - 41 U/L Final   • AST (SGOT) 03/29/2022 26  1 - 40 U/L Final   • Alkaline Phosphatase 03/29/2022 72  39 - 117 U/L Final   • Total Bilirubin 03/29/2022 0.3  0.0 - 1.2 mg/dL Final   • Globulin 03/29/2022 2.9  gm/dL Final   • A/G Ratio 03/29/2022 1.7  g/dL Final   • BUN/Creatinine Ratio 03/29/2022 16.5  7.0 - 25.0 Final   • Anion Gap 03/29/2022 12.0  5.0 - 15.0 mmol/L Final   • eGFR 03/29/2022 123.3  >60.0 mL/min/1.73 Final    National Kidney Foundation and American Society of Nephrology (ASN) Task Force recommended calculation based on the Chronic Kidney Disease Epidemiology Collaboration (CKD-EPI) equation refit without adjustment for race.   • Protime 03/30/2022 13.3  11.9 - 14.6 Seconds Final   • INR 03/30/2022 1.05  0.91 - 1.09 Final   • WBC 03/29/2022 10.46  3.40 - 10.80 10*3/mm3 Final   • RBC 03/29/2022 5.49  4.14 - 5.80 10*6/mm3 Final   • Hemoglobin 03/29/2022 15.9  13.0 - 17.7 g/dL Final   • Hematocrit 03/29/2022 46.5  37.5 - 51.0 % Final   • MCV 03/29/2022 84.7  79.0 - 97.0 fL Final   • MCH 03/29/2022 29.0  26.6 - 33.0 pg Final   • MCHC 03/29/2022 34.2  31.5 - 35.7 g/dL Final   • RDW 03/29/2022 12.4  12.3 - 15.4 % Final   • RDW-SD 03/29/2022 38.3  37.0 - 54.0 fl Final   • MPV 03/29/2022 9.4  6.0 - 12.0 fL Final   • Platelets 03/29/2022 372  140 - 450 10*3/mm3 Final   • Neutrophil % 03/29/2022 68.8  42.7 - 76.0 % Final   • Lymphocyte % 03/29/2022 23.0  19.6 - 45.3 % Final   • Monocyte % 03/29/2022 6.1  5.0 - 12.0 % Final   • Eosinophil % 03/29/2022 0.6  0.3 - 6.2 % Final   • Basophil % 03/29/2022 0.8  0.0 -  "1.5 % Final   • Immature Grans % 03/29/2022 0.7 (A) 0.0 - 0.5 % Final   • Neutrophils, Absolute 03/29/2022 7.20 (A) 1.70 - 7.00 10*3/mm3 Final   • Lymphocytes, Absolute 03/29/2022 2.41  0.70 - 3.10 10*3/mm3 Final   • Monocytes, Absolute 03/29/2022 0.64  0.10 - 0.90 10*3/mm3 Final   • Eosinophils, Absolute 03/29/2022 0.06  0.00 - 0.40 10*3/mm3 Final   • Basophils, Absolute 03/29/2022 0.08  0.00 - 0.20 10*3/mm3 Final   • Immature Grans, Absolute 03/29/2022 0.07 (A) 0.00 - 0.05 10*3/mm3 Final   • nRBC 03/29/2022 0.0  0.0 - 0.2 /100 WBC Final   • Ethanol % 03/29/2022 0.286  % Final         Assessment & Plan   Diagnoses and all orders for this visit:    1. Chronic alcoholism in remission (HCC) (Primary)    2. ARMIDA (generalized anxiety disorder)  -     buPROPion XL (Wellbutrin XL) 150 MG 24 hr tablet; Take 1 tablet by mouth Every Morning.  Dispense: 90 tablet; Refill: 0  -     traZODone (DESYREL) 50 MG tablet; Take 1 tablet by mouth Every Night.  Dispense: 90 tablet; Refill: 0        Visit Diagnoses:    ICD-10-CM ICD-9-CM   1. Chronic alcoholism in remission (HCC)  F10.21 303.93   2. ARMIDA (generalized anxiety disorder)  F41.1 300.02         GOALS:  Short Term Goals: Patient will be compliant with medication, and patient will have no significant medication related side effects.  Patient will be engaged in psychotherapy as indicated.  Patient will report subjective improvement of symptoms.  Long term goals: To stabilize mood and treat/improve subjective symptoms, the patient will stay out of the hospital, the patient will be at an optimal level of functioning, and the patient will take all medications as prescribed.  The patient/guardian verbalized understanding and agreement with goals that were mutually set.    RISK ASSESSMENT  Current harm-to-self risk is reported by pt as \"none.\"  Current qatz-la-thveot risk is reported by pt as \"none.\"   No suicidal thoughts, intent, plan is appreciated by this provider on this date of " exam.   No homicidal thoughts, intent, plan is appreciated by this provider on this date.    TREATMENT PLAN: Continue supportive psychotherapy efforts and medications as indicated.   Pharmacological and Non-Pharmacological treatment options discussed during today's visit. Patient/Guardian acknowledged and verbally consented with current treatment plan and was educated on the importance of compliance with treatment and follow-up appointments.      MEDICATION ISSUES:  Discussed medication options and treatment plan of prescribed medication as well as the risks, benefits, any black box warnings, and side effects including potential falls, possible impaired driving, and metabolic adversities among others. Patient is agreeable to call the office with any worsening of symptoms or onset of side effects, or if any concerns or questions arise.  The contact information for the office is made available to the patient. Patient is agreeable to call 911 or go to the nearest ER should they begin having any SI/HI, or if any urgent concerns arise. No medication side effects or related complaints today.     MEDS ORDERED DURING VISIT:  New Medications Ordered This Visit   Medications   • buPROPion XL (Wellbutrin XL) 150 MG 24 hr tablet     Sig: Take 1 tablet by mouth Every Morning.     Dispense:  90 tablet     Refill:  0     Keep on file for future fill   • traZODone (DESYREL) 50 MG tablet     Sig: Take 1 tablet by mouth Every Night.     Dispense:  90 tablet     Refill:  0     Keep on file for future fill       Follow Up Appointment:   Return in about 2 months (around 11/1/2022) for Recheck, Video visit.               This document has been electronically signed by GERONIMO Guadalupe  September 1, 2022 16:14 EDT    Dictated Utilizing Dragon Dictation: Part of this note may be an electronic transcription/translation of spoken language to printed text using the Dragon Dictation System.

## 2022-11-03 ENCOUNTER — TELEMEDICINE (OUTPATIENT)
Dept: PSYCHIATRY | Facility: CLINIC | Age: 32
End: 2022-11-03

## 2022-11-03 DIAGNOSIS — K21.9 GASTROESOPHAGEAL REFLUX DISEASE, UNSPECIFIED WHETHER ESOPHAGITIS PRESENT: ICD-10-CM

## 2022-11-03 DIAGNOSIS — F10.21 CHRONIC ALCOHOLISM IN REMISSION: ICD-10-CM

## 2022-11-03 DIAGNOSIS — F41.1 GAD (GENERALIZED ANXIETY DISORDER): Primary | ICD-10-CM

## 2022-11-03 PROCEDURE — 99214 OFFICE O/P EST MOD 30 MIN: CPT | Performed by: NURSE PRACTITIONER

## 2022-11-03 RX ORDER — TRAZODONE HYDROCHLORIDE 50 MG/1
50 TABLET ORAL NIGHTLY
Qty: 90 TABLET | Refills: 0 | Status: SHIPPED | OUTPATIENT
Start: 2022-11-03 | End: 2023-01-16 | Stop reason: SDUPTHER

## 2022-11-03 RX ORDER — HYDROXYZINE PAMOATE 25 MG/1
25 CAPSULE ORAL 3 TIMES DAILY PRN
Qty: 90 CAPSULE | Refills: 0 | Status: SHIPPED | OUTPATIENT
Start: 2022-11-03 | End: 2023-01-16 | Stop reason: SDUPTHER

## 2022-11-03 RX ORDER — BUPROPION HYDROCHLORIDE 150 MG/1
150 TABLET ORAL EVERY MORNING
Qty: 90 TABLET | Refills: 0 | Status: SHIPPED | OUTPATIENT
Start: 2022-11-03 | End: 2023-01-31 | Stop reason: SDUPTHER

## 2022-11-03 NOTE — PROGRESS NOTES
"This provider is located at [facility], [address] [place] [address], [facility], [address] using a secure [employer] Video Visit through [employer]. Patient is being seen remotely via telehealth at their home address in [place], and stated they are in a secure environment for this session. The patient's condition being diagnosed/treated is appropriate for telemedicine. The provider identified herself as well as her credentials.   The patient, and/or patients guardian, consent to be seen remotely, and when consent is given they understand that the consent allows for patient identifiable information to be sent to a third party as needed.   They may refuse to be seen remotely at any time. The electronic data is encrypted and password protected, and the patient and/or guardian has been advised of the potential risks to privacy not withstanding such measures.   PT Identifiers used: Name and .    You have chosen to receive care through a telehealth visit.  Do you consent to use a video/audio connection for your medical care today? Yes      Subjective   Giu Rivera is a 32 y.o. male who presents today for follow up    Chief Complaint: \"Having more issues with sleep\"    History of Present Illness:    History of Present Illness  Patient reported from his home, reporting above chief complaint.  Reports work is going well, he does have a new supervisor and this has made things better.  He continues to attend AA meetings at least once weekly if not every other week.  He is looking for a permanent sponsor and a different place for meetings as well.  He is having some more trouble sleeping but after assessment it is revealed that he has moved the administration of Wellbutrin to nighttime and after he thought about it he figured out that is about the time he had trouble sleeping.  He will move the administration of the Wellbutrin back to a.m., and take the trazodone about 10 PM as he normally does not actually get ready to go to sleep until " "about 1030.  He is also playing video games till about 1030p and is educated on how this can also tell his brain that it is daytime and may keep him from sleeping well.    Previous PHQ-9 was scored at 1, today scored at 3 due to sleep difficulties.  Previous ARMIDA-7 was scored at 2, today is scored at 3.       The following portions of the patient's history were reviewed and updated as appropriate: allergies, current medications, past family history, past medical history, past social history, past surgical history and problem list.    Past Psychiatric History:  Patient reports being diagnosed with attention deficit when he was a child and was prescribed Adderall and Vyvanse but he did not take the medicines consistently because they made him \"feel like a zombie.\"  History of substance abuse, sobriety as of September 1, 2022 for 3 months.  1 admission for rehab and [place] [facility].  Denies any history of suicide attempt, no history of self-harm behaviors.  Reports he was trialed on Prozac and had abnormal response to this, made him more aggressive.  Currently on Wellbutrin 150, trazodone 50 at night, hydroxyzine 25 as needed.  Feels like his mood is very stable at this point.      Past Medical History:  Past Medical History:   Diagnosis Date   • Alcohol abuse    • Anxiety    • Depression    • Wrist fracture     right       Substance Abuse History:   Types:Patient have extensive history of substance abuse, reports sobriety as of 9/1/2022 at approximately 3 months.  Attending AA, he has a sponsor back in [place] from the rehab, and he has a temporary sponsor locally.  Reports history of heavy alcohol use, and also used many other drugs in the past including cocaine, marijuana, benzodiazepines.  Withdrawal Symptoms:Denied any history of withdrawal seizures, but reports was having hallucinations with the last time that he heavily drank alcohol.      Social History:  Social History "     Socioeconomic History   • Marital status:    • Number of children: 0   • Highest education level: Some college, no degree   Tobacco Use   • Smoking status: Former     Types: Cigarettes   • Smokeless tobacco: Never   Vaping Use   • Vaping Use: Every day   • Substances: Nicotine   Substance and Sexual Activity   • Alcohol use: Not Currently     Comment: previously heavy use   • Drug use: Not Currently     Frequency: 7.0 times per week     Types: Marijuana, Benzodiazepines, Cocaine(coke), MDMA (ecstacy)   • Sexual activity: Yes   Patient reported good support system with his wife and sponsors.  He is currently employed in a [employer] in [facility].  Denies any history of legal problems, denies any history of  service.  Endorses non Mormonism spiritual beliefs.    Family History:  Family History   Problem Relation Age of Onset   • Alcohol abuse Maternal Uncle    • Alcohol abuse Maternal Grandmother    • Heart attack Maternal Grandmother    • Diabetes Paternal Grandmother    • Alcohol abuse Cousin        Past Surgical History:  History reviewed. No pertinent surgical history.    Problem List:  Patient Active Problem List   Diagnosis   • Gastroesophageal reflux disease   • Thrush   • Alcohol abuse       Allergy:   Allergies   Allergen Reactions   • Prozac [Fluoxetine] Other (See Comments)     agitation        Current Medications:   Current Outpatient Medications   Medication Sig Dispense Refill   • buPROPion XL (Wellbutrin XL) 150 MG 24 hr tablet Take 1 tablet by mouth Every Morning. 90 tablet 0   • hydrOXYzine pamoate (VISTARIL) 25 MG capsule Take 1 capsule by mouth 3 (Three) Times a Day As Needed for Anxiety. 90 capsule 0   • traZODone (DESYREL) 50 MG tablet Take 1 tablet by mouth Every Night. 90 tablet 0   • omeprazole (priLOSEC) 40 MG capsule Take 1 capsule by mouth Daily. 90 capsule 1   • ondansetron ODT (ZOFRAN-ODT) 8 MG disintegrating tablet Place 1 tablet on the tongue Every  8 (Eight) Hours As Needed for Nausea or Vomiting. 20 tablet 0     No current facility-administered medications for this visit.       Review of Systems:    Review of Systems   Psychiatric/Behavioral: Positive for positive for hyperactivity.   All other systems reviewed and are negative.        Physical Exam:   Physical Exam  Vitals reviewed.   Constitutional:       Appearance: Normal appearance.   HENT:      Head: Normocephalic.   Neurological:      Mental Status: He is alert.   Psychiatric:         Attention and Perception: Attention and perception normal.         Mood and Affect: Mood and affect normal.         Behavior: Behavior is hyperactive. Behavior is cooperative.         Thought Content: Thought content normal.         Cognition and Memory: Cognition and memory normal.         Judgment: Judgment normal.      Comments: Speech is rapid but not pressured         Vitals:  There were no vitals taken for this visit. There is no height or weight on file to calculate BMI.  Due to extenuating circumstances and possible current health risks associated with the patient being present in a clinical setting (with current health restrictions in place in regards to possible COVID 19 transmission/exposure), the patient was seen remotely today via a MyChart Video Visit through [facility] and telephone encounter.  Unable to obtain vital signs due to nature of remote visit.  Height stated at 66 inches.  Weight stated at 163 pounds.    Last 3 Blood Pressure Readings:  BP Readings from Last 3 Encounters:   09/30/22 133/83   07/26/22 121/76   05/23/22 148/86       PHQ-9 Score:   PHQ-9 Total Score: (P) 3  PHQ-9 Depression Screening  Little interest or pleasure in doing things? (P) 1   Feeling down, depressed, or hopeless? (P) 0   Trouble falling or staying asleep, or sleeping too much? (P) 2   Feeling tired or having little energy? (P) 0   Poor appetite or overeating? (P) 0   Feeling bad about yourself - or that you are a failure or have  let yourself or your family down? (P) 0   Trouble concentrating on things, such as reading the newspaper or watching television? (P) 0   Moving or speaking so slowly that other people could have noticed? Or the opposite - being so fidgety or restless that you have been moving around a lot more than usual? (P) 0   Thoughts that you would be better off dead, or of hurting yourself in some way? (P) 0   PHQ-9 Total Score (P) 3   If you checked off any problems, how difficult have these problems made it for you to do your work, take care of things at home, or get along with other people? (P) Somewhat difficult     PHQ-9 Total Score: (P) 3      Feeling nervous, anxious or on edge: (P) Several days  Not being able to stop or control worrying: (P) Several days  Worrying too much about different things: (P) Not at all  Trouble Relaxing: (P) Not at all  Being so restless that it is hard to sit still: (P) Not at all  Feeling afraid as if something awful might happen: (P) Not at all  Becoming easily annoyed or irritable: (P) Several days  ARMIDA 7 Total Score: (P) 3  If you checked any problems, how difficult have these problems made it for you to do your work, take care of things at home, or get along with other people: (P) Not difficult at all      PROMIS scale screening tool that patient filled out virtually reviewed by this APRN at today's encounter.      Mental Status Exam:   Hygiene:   good  Cooperation:  Cooperative  Eye Contact:  Good  Psychomotor Behavior:  Hyperactive  Affect:  Full range  Mood: normal  Hopelessness: Denies  Speech:  Rapid  Thought Process:  Goal directed and Linear  Thought Content:  Normal  Suicidal:  None  Homicidal:  None  Hallucinations:  None  Delusion:  None  Memory:  Intact  Orientation:  Person, Place, Time and Situation  Reliability:  good  Insight:  Good  Judgement:  Good  Impulse Control:  Good  Physical/Medical Issues:  No        Lab Results:   No visits with results within 6 Month(s) from  this visit.   Latest known visit with results is:   Admission on 03/29/2022, Discharged on 03/30/2022   Component Date Value Ref Range Status   • Extra Tube 03/29/2022 Hold for add-ons.   Final    Auto resulted.   • Extra Tube 03/29/2022 hold for add-on   Final    Auto resulted   • Extra Tube 03/29/2022 Hold for add-ons.   Final    Auto resulted.   • Extra Tube 03/29/2022 hold for add-on   Final    Auto resulted   • Glucose 03/29/2022 111 (H)  65 - 99 mg/dL Final   • BUN 03/29/2022 13  6 - 20 mg/dL Final   • Creatinine 03/29/2022 0.79  0.76 - 1.27 mg/dL Final   • Sodium 03/29/2022 138  136 - 145 mmol/L Final   • Potassium 03/29/2022 4.3  3.5 - 5.2 mmol/L Final   • Chloride 03/29/2022 99  98 - 107 mmol/L Final   • CO2 03/29/2022 27.0  22.0 - 29.0 mmol/L Final   • Calcium 03/29/2022 9.0  8.6 - 10.5 mg/dL Final   • Total Protein 03/29/2022 7.8  6.0 - 8.5 g/dL Final   • Albumin 03/29/2022 4.90  3.50 - 5.20 g/dL Final   • ALT (SGPT) 03/29/2022 22  1 - 41 U/L Final   • AST (SGOT) 03/29/2022 26  1 - 40 U/L Final   • Alkaline Phosphatase 03/29/2022 72  39 - 117 U/L Final   • Total Bilirubin 03/29/2022 0.3  0.0 - 1.2 mg/dL Final   • Globulin 03/29/2022 2.9  gm/dL Final   • A/G Ratio 03/29/2022 1.7  g/dL Final   • BUN/Creatinine Ratio 03/29/2022 16.5  7.0 - 25.0 Final   • Anion Gap 03/29/2022 12.0  5.0 - 15.0 mmol/L Final   • eGFR 03/29/2022 123.3  >60.0 mL/min/1.73 Final    National Kidney Foundation and American Society of Nephrology (ASN) Task Force recommended calculation based on the Chronic Kidney Disease Epidemiology Collaboration (CKD-EPI) equation refit without adjustment for race.   • Protime 03/30/2022 13.3  11.9 - 14.6 Seconds Final   • INR 03/30/2022 1.05  0.91 - 1.09 Final   • WBC 03/29/2022 10.46  3.40 - 10.80 10*3/mm3 Final   • RBC 03/29/2022 5.49  4.14 - 5.80 10*6/mm3 Final   • Hemoglobin 03/29/2022 15.9  13.0 - 17.7 g/dL Final   • Hematocrit 03/29/2022 46.5  37.5 - 51.0 % Final   • MCV 03/29/2022 84.7  79.0  - 97.0 fL Final   • MCH 03/29/2022 29.0  26.6 - 33.0 pg Final   • MCHC 03/29/2022 34.2  31.5 - 35.7 g/dL Final   • RDW 03/29/2022 12.4  12.3 - 15.4 % Final   • RDW-SD 03/29/2022 38.3  37.0 - 54.0 fl Final   • MPV 03/29/2022 9.4  6.0 - 12.0 fL Final   • Platelets 03/29/2022 372  140 - 450 10*3/mm3 Final   • Neutrophil % 03/29/2022 68.8  42.7 - 76.0 % Final   • Lymphocyte % 03/29/2022 23.0  19.6 - 45.3 % Final   • Monocyte % 03/29/2022 6.1  5.0 - 12.0 % Final   • Eosinophil % 03/29/2022 0.6  0.3 - 6.2 % Final   • Basophil % 03/29/2022 0.8  0.0 - 1.5 % Final   • Immature Grans % 03/29/2022 0.7 (H)  0.0 - 0.5 % Final   • Neutrophils, Absolute 03/29/2022 7.20 (H)  1.70 - 7.00 10*3/mm3 Final   • Lymphocytes, Absolute 03/29/2022 2.41  0.70 - 3.10 10*3/mm3 Final   • Monocytes, Absolute 03/29/2022 0.64  0.10 - 0.90 10*3/mm3 Final   • Eosinophils, Absolute 03/29/2022 0.06  0.00 - 0.40 10*3/mm3 Final   • Basophils, Absolute 03/29/2022 0.08  0.00 - 0.20 10*3/mm3 Final   • Immature Grans, Absolute 03/29/2022 0.07 (H)  0.00 - 0.05 10*3/mm3 Final   • nRBC 03/29/2022 0.0  0.0 - 0.2 /100 WBC Final   • Ethanol % 03/29/2022 0.286  % Final         Assessment & Plan   Diagnoses and all orders for this visit:    1. ARMIDA (generalized anxiety disorder) (Primary)  -     buPROPion XL (Wellbutrin XL) 150 MG 24 hr tablet; Take 1 tablet by mouth Every Morning.  Dispense: 90 tablet; Refill: 0  -     traZODone (DESYREL) 50 MG tablet; Take 1 tablet by mouth Every Night.  Dispense: 90 tablet; Refill: 0  -     hydrOXYzine pamoate (VISTARIL) 25 MG capsule; Take 1 capsule by mouth 3 (Three) Times a Day As Needed for Anxiety.  Dispense: 90 capsule; Refill: 0    2. Chronic alcoholism in remission ([facility])        Visit Diagnoses:    ICD-10-CM ICD-9-CM   1. ARMIDA (generalized anxiety disorder)  F41.1 300.02   2. Chronic alcoholism in remission (HCC)  F10.21 303.93         GOALS:  Short Term Goals: Patient will be compliant with medication, and patient will have  "no significant medication related side effects.  Patient will be engaged in psychotherapy as indicated.  Patient will report subjective improvement of symptoms.  Long term goals: To stabilize mood and treat/improve subjective symptoms, the patient will stay out of the hospital, the patient will be at an optimal level of functioning, and the patient will take all medications as prescribed.  The patient/guardian verbalized understanding and agreement with goals that were mutually set.    RISK ASSESSMENT  Current harm-to-self risk is reported by pt as \"none.\"  Current [id number] risk is reported by pt as \"none.\"   No suicidal thoughts, intent, plan is appreciated by this provider on this date of exam.   No homicidal thoughts, intent, plan is appreciated by this provider on this date.    TREATMENT PLAN: Continue supportive psychotherapy efforts and medications as indicated.   Pharmacological and Non-Pharmacological treatment options discussed during today's visit. Patient/Guardian acknowledged and verbally consented with current treatment plan and was educated on the importance of compliance with treatment and follow-up appointments.      MEDICATION ISSUES:  Discussed medication options and treatment plan of prescribed medication as well as the risks, benefits, any black box warnings, and side effects including potential falls, possible impaired driving, and metabolic adversities among others. Patient is agreeable to call the office with any worsening of symptoms or onset of side effects, or if any concerns or questions arise.  The contact information for the office is made available to the patient. Patient is agreeable to call 911 or go to the nearest ER should they begin having any SI/HI, or if any urgent concerns arise. No medication side effects or related complaints today.     Move administration of Wellbutrin back to morning  Continue trazodone 50 mg about 30 minutes prior to our sleep  Continue hydroxyzine 25 " mg capsule up to 3 times a day as needed for anxiety    MEDS ORDERED DURING VISIT:  New Medications Ordered This Visit   Medications   • buPROPion XL (Wellbutrin XL) 150 MG 24 hr tablet     Sig: Take 1 tablet by mouth Every Morning.     Dispense:  90 tablet     Refill:  0   • traZODone (DESYREL) 50 MG tablet     Sig: Take 1 tablet by mouth Every Night.     Dispense:  90 tablet     Refill:  0   • hydrOXYzine pamoate (VISTARIL) 25 MG capsule     Sig: Take 1 capsule by mouth 3 (Three) Times a Day As Needed for Anxiety.     Dispense:  90 capsule     Refill:  0       Follow Up Appointment:   Return in about 3 months (around 1/31/2023) for Recheck, Video visit.               This document has been electronically signed by GERONIMO Guadalupe  November 3, 2022 16:55 EDT    Dictated Utilizing Dragon Dictation: Part of this note may be an electronic transcription/translation of spoken language to printed text using the Dragon Dictation System.

## 2022-11-04 RX ORDER — OMEPRAZOLE 40 MG/1
40 CAPSULE, DELAYED RELEASE ORAL DAILY
Qty: 90 CAPSULE | Refills: 1 | Status: SHIPPED | OUTPATIENT
Start: 2022-11-04

## 2023-01-16 DIAGNOSIS — F41.1 GAD (GENERALIZED ANXIETY DISORDER): ICD-10-CM

## 2023-01-17 RX ORDER — HYDROXYZINE PAMOATE 25 MG/1
25 CAPSULE ORAL 3 TIMES DAILY PRN
Qty: 90 CAPSULE | Refills: 0 | Status: SHIPPED | OUTPATIENT
Start: 2023-01-17 | End: 2023-01-31

## 2023-01-17 RX ORDER — TRAZODONE HYDROCHLORIDE 50 MG/1
50 TABLET ORAL NIGHTLY
Qty: 90 TABLET | Refills: 0 | Status: SHIPPED | OUTPATIENT
Start: 2023-01-17

## 2023-01-31 ENCOUNTER — TELEMEDICINE (OUTPATIENT)
Dept: PSYCHIATRY | Facility: CLINIC | Age: 33
End: 2023-01-31
Payer: MEDICAID

## 2023-01-31 DIAGNOSIS — F10.21 CHRONIC ALCOHOLISM IN REMISSION: ICD-10-CM

## 2023-01-31 DIAGNOSIS — F41.1 GAD (GENERALIZED ANXIETY DISORDER): Primary | ICD-10-CM

## 2023-01-31 PROCEDURE — 99214 OFFICE O/P EST MOD 30 MIN: CPT | Performed by: NURSE PRACTITIONER

## 2023-01-31 RX ORDER — HYDROXYZINE PAMOATE 25 MG/1
25 CAPSULE ORAL 3 TIMES DAILY PRN
Qty: 90 CAPSULE | Refills: 2 | Status: SHIPPED | OUTPATIENT
Start: 2023-01-31

## 2023-01-31 RX ORDER — BUPROPION HYDROCHLORIDE 150 MG/1
150 TABLET ORAL EVERY MORNING
Qty: 90 TABLET | Refills: 0 | Status: SHIPPED | OUTPATIENT
Start: 2023-01-31

## 2023-01-31 NOTE — PROGRESS NOTES
"This provider is located at [facility], [address] [place] [address], [facility], [id number] using a secure [employer] Video Visit through [employer]. Patient is being seen remotely via telehealth at their home address in [place], and stated they are in a secure environment for this session. The patient's condition being diagnosed/treated is appropriate for telemedicine. The provider identified herself as well as her credentials.   The patient, and/or patients guardian, consent to be seen remotely, and when consent is given they understand that the consent allows for patient identifiable information to be sent to a third party as needed.   They may refuse to be seen remotely at any time. The electronic data is encrypted and password protected, and the patient and/or guardian has been advised of the potential risks to privacy not withstanding such measures.   PT Identifiers used: Name and .    You have chosen to receive care through a telehealth visit.  Do you consent to use a video/audio connection for your medical care today? Yes      Subjective   Gui Rivera is a 32 y.o. male who presents today for follow up    Chief Complaint: \"feeling panicky more often\"    History of Present Illness:    History of Present Illness  Overall feels like he is doing well except for above chief complaint.  He moved the Wellbutrin back to morning administration and has been utilizing a Medi planner to help him remember to take the medicine.  Reports this has helped sleep quite a bit.  He still uses trazodone as needed.  Work is going well and he had to work for a couple extra people today due to weather conditions some people did not come to work.  He reports that was fine he was happy to help out.  Reports he is having some more frequent spells of feeling panic come on, he does utilize the hydroxyzine and reports this takes care of it.  He does not want to have to take the medicine more often than he has to.  I did educate him that it was " "nonaddictive and that it is ordered up to 3 times a day and if he needs it 3 times a day he needs to take it 3 times a day.  Reports currently as of today's date sobriety is bright at about 250 days.  He continues to participate with AA and denies he has had any cravings for alcohol.  He reports he does sometimes have \"using dreams\" and will sometimes wake up and have to reassure himself that it was only a dream.          The following portions of the patient's history were reviewed and updated as appropriate: allergies, current medications, past family history, past medical history, past social history, past surgical history and problem list.    Past Psychiatric History:  Patient reports being diagnosed with attention deficit when he was a child and was prescribed Adderall and Vyvanse but he did not take the medicines consistently because they made him \"feel like a zombie.\"  History of substance abuse, sobriety as of September 1, 2022 for 3 months.  1 admission for rehab and [facility].  Denies any history of suicide attempt, no history of self-harm behaviors.  Reports he was trialed on Prozac and had abnormal response to this, made him more aggressive.  Currently on Wellbutrin 150, trazodone 50 at night, hydroxyzine 25 as needed.  Feels like his mood is very stable at this point.      Past Medical History:  Past Medical History:   Diagnosis Date   • ADHD (attention deficit hyperactivity disorder)    • Alcohol abuse    • Alcoholism (HCC)    • Anxiety    • Depression    • Wrist fracture     right       Substance Abuse History:   Types:Patient have extensive history of substance abuse, reports sobriety as of 9/1/2022 at approximately 3 months.  Attending , he has a sponsor back in [place] from the rehab, and he has a temporary sponsor locally.  Reports history of heavy alcohol use, and also used many other drugs in the past including cocaine, marijuana, benzodiazepines.  Withdrawal " Symptoms:Denied any history of withdrawal seizures, but reports was having hallucinations with the last time that he heavily drank alcohol.      Social History:  Social History     Socioeconomic History   • Marital status:    • Number of children: 0   • Highest education level: Some college, no degree   Tobacco Use   • Smoking status: Every Day     Types: Electronic Cigarette   • Smokeless tobacco: Never   Vaping Use   • Vaping Use: Every day   • Substances: Nicotine   Substance and Sexual Activity   • Alcohol use: Not Currently     Comment: previously heavy use   • Drug use: Not Currently     Frequency: 7.0 times per week     Types: Marijuana, Benzodiazepines, Cocaine(coke), MDMA (ecstacy)   • Sexual activity: Yes     Partners: Female     Birth control/protection: Implant   Patient reported good support system with his wife and sponsors.  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  Denies any history of legal problems, denies any history of  service.  Endorses non Faith spiritual beliefs.    Family History:  Family History   Problem Relation Age of Onset   • Alcohol abuse Maternal Uncle    • Alcohol abuse Maternal Grandmother    • Heart attack Maternal Grandmother    • Diabetes Paternal Grandmother    • Alcohol abuse Cousin    • Depression Father        Past Surgical History:  History reviewed. No pertinent surgical history.    Problem List:  Patient Active Problem List   Diagnosis   • Gastroesophageal reflux disease   • Thrush   • Alcohol abuse       Allergy:   Allergies   Allergen Reactions   • Prozac [Fluoxetine] Other (See Comments)     agitation        Current Medications:   Current Outpatient Medications   Medication Sig Dispense Refill   • buPROPion XL (Wellbutrin XL) 150 MG 24 hr tablet Take 1 tablet by mouth Every Morning. 90 tablet 0   • hydrOXYzine pamoate (VISTARIL) 25 MG capsule Take 1 capsule by mouth 3 (Three) Times a Day As Needed for Anxiety. 90  capsule 2   • omeprazole (priLOSEC) 40 MG capsule TAKE 1 CAPSULE BY MOUTH DAILY. 90 capsule 1   • traZODone (DESYREL) 50 MG tablet Take 1 tablet by mouth Every Night. 90 tablet 0   • ondansetron ODT (ZOFRAN-ODT) 8 MG disintegrating tablet Place 1 tablet on the tongue Every 8 (Eight) Hours As Needed for Nausea or Vomiting. 20 tablet 0     No current facility-administered medications for this visit.       Review of Systems:    Review of Systems   Psychiatric/Behavioral: Positive for positive for hyperactivity.   All other systems reviewed and are negative.        Physical Exam:   Physical Exam  Vitals reviewed.   Constitutional:       Appearance: Normal appearance.   HENT:      Head: Normocephalic.   Neurological:      Mental Status: He is alert.   Psychiatric:         Attention and Perception: Attention and perception normal.         Mood and Affect: Mood and affect normal.         Behavior: Behavior is hyperactive. Behavior is cooperative.         Thought Content: Thought content normal.         Cognition and Memory: Cognition and memory normal.         Judgment: Judgment normal.      Comments: Speech is rapid but not pressured         Vitals:  There were no vitals taken for this visit. There is no height or weight on file to calculate BMI.  Due to extenuating circumstances and possible current health risks associated with the patient being present in a clinical setting (with current health restrictions in place in regards to possible COVID 19 transmission/exposure), the patient was seen remotely today via a MyChart Video Visit through [facility] and telephone encounter.  Unable to obtain vital signs due to nature of remote visit.  Height stated at 66 inches.  Weight stated at 174 pounds.    Last 3 Blood Pressure Readings:  BP Readings from Last 3 Encounters:   09/30/22 133/83   07/26/22 121/76   05/23/22 148/86          Mental Status Exam:   Hygiene:   good  Cooperation:  Cooperative  Eye Contact:  Good  Psychomotor  Behavior:  Hyperactive  Affect:  Full range  Mood: euthymic  Hopelessness: Denies  Speech:  Rapid  Thought Process:  Goal directed and Linear  Thought Content:  Normal  Suicidal:  None  Homicidal:  None  Hallucinations:  None  Delusion:  None  Memory:  Intact  Orientation:  Person, Place, Time and Situation  Reliability:  good  Insight:  Good  Judgement:  Good  Impulse Control:  Good  Physical/Medical Issues:  No        Lab Results:   No visits with results within 1 Month(s) from this visit.   Latest known visit with results is:   Admission on 03/29/2022, Discharged on 03/30/2022   Component Date Value Ref Range Status   • Extra Tube 03/29/2022 Hold for add-ons.   Final    Auto resulted.   • Extra Tube 03/29/2022 hold for add-on   Final    Auto resulted   • Extra Tube 03/29/2022 Hold for add-ons.   Final    Auto resulted.   • Extra Tube 03/29/2022 hold for add-on   Final    Auto resulted   • Glucose 03/29/2022 111 (H)  65 - 99 mg/dL Final   • BUN 03/29/2022 13  6 - 20 mg/dL Final   • Creatinine 03/29/2022 0.79  0.76 - 1.27 mg/dL Final   • Sodium 03/29/2022 138  136 - 145 mmol/L Final   • Potassium 03/29/2022 4.3  3.5 - 5.2 mmol/L Final   • Chloride 03/29/2022 99  98 - 107 mmol/L Final   • CO2 03/29/2022 27.0  22.0 - 29.0 mmol/L Final   • Calcium 03/29/2022 9.0  8.6 - 10.5 mg/dL Final   • Total Protein 03/29/2022 7.8  6.0 - 8.5 g/dL Final   • Albumin 03/29/2022 4.90  3.50 - 5.20 g/dL Final   • ALT (SGPT) 03/29/2022 22  1 - 41 U/L Final   • AST (SGOT) 03/29/2022 26  1 - 40 U/L Final   • Alkaline Phosphatase 03/29/2022 72  39 - 117 U/L Final   • Total Bilirubin 03/29/2022 0.3  0.0 - 1.2 mg/dL Final   • Globulin 03/29/2022 2.9  gm/dL Final   • A/G Ratio 03/29/2022 1.7  g/dL Final   • BUN/Creatinine Ratio 03/29/2022 16.5  7.0 - 25.0 Final   • Anion Gap 03/29/2022 12.0  5.0 - 15.0 mmol/L Final   • eGFR 03/29/2022 123.3  >60.0 mL/min/1.73 Final    National Kidney Foundation and American Society of Nephrology (ASN) Task  Force recommended calculation based on the Chronic Kidney Disease Epidemiology Collaboration (CKD-EPI) equation refit without adjustment for race.   • Protime 03/30/2022 13.3  11.9 - 14.6 Seconds Final   • INR 03/30/2022 1.05  0.91 - 1.09 Final   • WBC 03/29/2022 10.46  3.40 - 10.80 10*3/mm3 Final   • RBC 03/29/2022 5.49  4.14 - 5.80 10*6/mm3 Final   • Hemoglobin 03/29/2022 15.9  13.0 - 17.7 g/dL Final   • Hematocrit 03/29/2022 46.5  37.5 - 51.0 % Final   • MCV 03/29/2022 84.7  79.0 - 97.0 fL Final   • MCH 03/29/2022 29.0  26.6 - 33.0 pg Final   • MCHC 03/29/2022 34.2  31.5 - 35.7 g/dL Final   • RDW 03/29/2022 12.4  12.3 - 15.4 % Final   • RDW-SD 03/29/2022 38.3  37.0 - 54.0 fl Final   • MPV 03/29/2022 9.4  6.0 - 12.0 fL Final   • Platelets 03/29/2022 372  140 - 450 10*3/mm3 Final   • Neutrophil % 03/29/2022 68.8  42.7 - 76.0 % Final   • Lymphocyte % 03/29/2022 23.0  19.6 - 45.3 % Final   • Monocyte % 03/29/2022 6.1  5.0 - 12.0 % Final   • Eosinophil % 03/29/2022 0.6  0.3 - 6.2 % Final   • Basophil % 03/29/2022 0.8  0.0 - 1.5 % Final   • Immature Grans % 03/29/2022 0.7 (H)  0.0 - 0.5 % Final   • Neutrophils, Absolute 03/29/2022 7.20 (H)  1.70 - 7.00 10*3/mm3 Final   • Lymphocytes, Absolute 03/29/2022 2.41  0.70 - 3.10 10*3/mm3 Final   • Monocytes, Absolute 03/29/2022 0.64  0.10 - 0.90 10*3/mm3 Final   • Eosinophils, Absolute 03/29/2022 0.06  0.00 - 0.40 10*3/mm3 Final   • Basophils, Absolute 03/29/2022 0.08  0.00 - 0.20 10*3/mm3 Final   • Immature Grans, Absolute 03/29/2022 0.07 (H)  0.00 - 0.05 10*3/mm3 Final   • nRBC 03/29/2022 0.0  0.0 - 0.2 /100 WBC Final   • Ethanol % 03/29/2022 0.286  % Final         Assessment & Plan   Diagnoses and all orders for this visit:    1. ARMIDA (generalized anxiety disorder) (Primary)  -     buPROPion XL (Wellbutrin XL) 150 MG 24 hr tablet; Take 1 tablet by mouth Every Morning.  Dispense: 90 tablet; Refill: 0  -     hydrOXYzine pamoate (VISTARIL) 25 MG capsule; Take 1 capsule by mouth  "3 (Three) Times a Day As Needed for Anxiety.  Dispense: 90 capsule; Refill: 2    2. Chronic alcoholism in remission ([facility])        Visit Diagnoses:    ICD-10-CM ICD-9-CM   1. ARMIDA (generalized anxiety disorder)  F41.1 300.02   2. Chronic alcoholism in remission (HCC)  F10.21 303.93         GOALS:  Short Term Goals: Patient will be compliant with medication, and patient will have no significant medication related side effects.  Patient will be engaged in psychotherapy as indicated.  Patient will report subjective improvement of symptoms.  Long term goals: To stabilize mood and treat/improve subjective symptoms, the patient will stay out of the hospital, the patient will be at an optimal level of functioning, and the patient will take all medications as prescribed.  The patient/guardian verbalized understanding and agreement with goals that were mutually set.    RISK ASSESSMENT  Current harm-to-self risk is reported by pt as \"none.\"  Current [id number] risk is reported by pt as \"none.\"   No suicidal thoughts, intent, plan is appreciated by this provider on this date of exam.   No homicidal thoughts, intent, plan is appreciated by this provider on this date.    TREATMENT PLAN: Continue supportive psychotherapy efforts and medications as indicated.   Pharmacological and Non-Pharmacological treatment options discussed during today's visit. Patient/Guardian acknowledged and verbally consented with current treatment plan and was educated on the importance of compliance with treatment and follow-up appointments.      MEDICATION ISSUES:  Discussed medication options and treatment plan of prescribed medication as well as the risks, benefits, any black box warnings, and side effects including potential falls, possible impaired driving, and metabolic adversities among others. Patient is agreeable to call the office with any worsening of symptoms or onset of side effects, or if any concerns or questions arise.  The contact " information for the office is made available to the patient. Patient is agreeable to call 911 or go to the nearest ER should they begin having any SI/HI, or if any urgent concerns arise. No medication side effects or related complaints today.     Move administration of Wellbutrin back to morning  Continue trazodone 50 mg about 30 minutes prior to our sleep  Continue hydroxyzine 25 mg capsule up to 3 times a day as needed for anxiety    MEDS ORDERED DURING VISIT:  New Medications Ordered This Visit   Medications   • buPROPion XL (Wellbutrin XL) 150 MG 24 hr tablet     Sig: Take 1 tablet by mouth Every Morning.     Dispense:  90 tablet     Refill:  0   • hydrOXYzine pamoate (VISTARIL) 25 MG capsule     Sig: Take 1 capsule by mouth 3 (Three) Times a Day As Needed for Anxiety.     Dispense:  90 capsule     Refill:  2       Follow Up Appointment:   Return in about 3 months (around 4/30/2023) for Recheck, Video visit.               This document has been electronically signed by GERONIMO Guadalupe  January 31, 2023 17:12 EST    Dictated Utilizing Dragon Dictation: Part of this note may be an electronic transcription/translation of spoken language to printed text using the Dragon Dictation System.

## 2023-04-11 DIAGNOSIS — K21.9 GASTROESOPHAGEAL REFLUX DISEASE, UNSPECIFIED WHETHER ESOPHAGITIS PRESENT: ICD-10-CM

## 2023-04-11 RX ORDER — OMEPRAZOLE 40 MG/1
40 CAPSULE, DELAYED RELEASE ORAL DAILY
Qty: 90 CAPSULE | Refills: 1 | Status: SHIPPED | OUTPATIENT
Start: 2023-04-11

## 2023-04-18 DIAGNOSIS — F41.1 GAD (GENERALIZED ANXIETY DISORDER): ICD-10-CM

## 2023-04-19 ENCOUNTER — TELEMEDICINE (OUTPATIENT)
Dept: PSYCHIATRY | Facility: CLINIC | Age: 33
End: 2023-04-19
Payer: MEDICAID

## 2023-04-19 DIAGNOSIS — F10.21 CHRONIC ALCOHOLISM IN REMISSION: ICD-10-CM

## 2023-04-19 DIAGNOSIS — F41.1 GAD (GENERALIZED ANXIETY DISORDER): Primary | ICD-10-CM

## 2023-04-19 RX ORDER — HYDROXYZINE PAMOATE 25 MG/1
25 CAPSULE ORAL 3 TIMES DAILY PRN
Qty: 90 CAPSULE | Refills: 2 | OUTPATIENT
Start: 2023-04-19

## 2023-04-19 RX ORDER — HYDROXYZINE PAMOATE 50 MG/1
50 CAPSULE ORAL 3 TIMES DAILY PRN
Qty: 90 CAPSULE | Refills: 2 | Status: SHIPPED | OUTPATIENT
Start: 2023-04-19

## 2023-04-19 RX ORDER — BUPROPION HYDROCHLORIDE 150 MG/1
150 TABLET ORAL EVERY MORNING
Qty: 90 TABLET | Refills: 0 | Status: SHIPPED | OUTPATIENT
Start: 2023-04-19

## 2023-04-19 RX ORDER — TRAZODONE HYDROCHLORIDE 50 MG/1
50 TABLET ORAL NIGHTLY
Qty: 90 TABLET | Refills: 0 | Status: SHIPPED | OUTPATIENT
Start: 2023-04-19

## 2023-04-19 NOTE — PROGRESS NOTES
"This provider is located at [facility], [address] [place] [address], [facility], [address] using a secure MyChart Video Visit through [employer]. Patient is being seen remotely via telehealth at their home address in [place], and stated they are in a secure environment for this session. The patient's condition being diagnosed/treated is appropriate for telemedicine. The provider identified herself as well as her credentials.   The patient, and/or patients guardian, consent to be seen remotely, and when consent is given they understand that the consent allows for patient identifiable information to be sent to a third party as needed.   They may refuse to be seen remotely at any time. The electronic data is encrypted and password protected, and the patient and/or guardian has been advised of the potential risks to privacy not withstanding such measures.   PT Identifiers used: Name and .    You have chosen to receive care through a telehealth visit.  Do you consent to use a video/audio connection for your medical care today? Yes      Subjective   Gui Rivera is a 32 y.o. male who presents today for follow up    Chief Complaint: \"Anxiety bothering me\"    History of Present Illness:    History of Present Illness  Having more anxiety recently and using the Vistaril 25 mg 3 times a day consistently.  He cannot really identify a trigger for the anxiety.  He is heavily involved in a akin group where he and some friends of the building it up and now it is over 2000 people.  He is still involved with AA but not going to as many meetings because he does not have time.  Remains gainfully employed.  Everything at work is good, everything at home is good.  He does notice if he forgets to take the Wellbutrin he can become more irritable that day.  He still uses a Medi planner.  Trazodone is effective to help him sleep if he needs it.  Agreeable to increase the Vistaril to 50 mg take up to 3 times a day.  PHQ-9 today is scored at 2, " "ARMIDA-7 is scored at 10.       The following portions of the patient's history were reviewed and updated as appropriate: allergies, current medications, past family history, past medical history, past social history, past surgical history and problem list.    Past Psychiatric History:  Patient reports being diagnosed with attention deficit when he was a child and was prescribed Adderall and Vyvanse but he did not take the medicines consistently because they made him \"feel like a zombie.\"  History of substance abuse, sobriety as of September 1, 2022 for 3 months.  1 admission for rehab and [facility].  Denies any history of suicide attempt, no history of self-harm behaviors.  Reports he was trialed on Prozac and had abnormal response to this, made him more aggressive.       Past Medical History:  Past Medical History:   Diagnosis Date   • ADHD (attention deficit hyperactivity disorder)    • Alcohol abuse    • Alcoholism    • Anxiety    • Depression    • Wrist fracture     right       Substance Abuse History:   Types:Patient have extensive history of substance abuse, reports sobriety as of 9/1/2022 at approximately 3 months.  Attending AA, he has a sponsor back in [place] from the rehab, and he has a temporary sponsor locally.  Reports history of heavy alcohol use, and also used many other drugs in the past including cocaine, marijuana, benzodiazepines.  Withdrawal Symptoms:Denied any history of withdrawal seizures, but reports was having hallucinations with the last time that he heavily drank alcohol.      Social History:  Social History     Socioeconomic History   • Marital status:    • Number of children: 0   • Highest education level: Some college, no degree   Tobacco Use   • Smoking status: Every Day     Types: Electronic Cigarette   • Smokeless tobacco: Never   Vaping Use   • Vaping Use: Every day   • Substances: Nicotine   Substance and Sexual Activity   • Alcohol use: Not " Currently     Comment: previously heavy use   • Drug use: Not Currently     Frequency: 7.0 times per week     Types: Benzodiazepines, Cocaine(coke), Hashish, Ketamine, Marijuana, MDMA (ecstacy)   • Sexual activity: Yes     Partners: Female     Birth control/protection: Implant   Patient reported good support system with his wife and sponsors.  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  Denies any history of legal problems, denies any history of  service.  Endorses non Islam spiritual beliefs.    Family History:  Family History   Problem Relation Age of Onset   • Alcohol abuse Maternal Uncle    • Alcohol abuse Maternal Grandmother    • Heart attack Maternal Grandmother    • Diabetes Paternal Grandmother    • Alcohol abuse Cousin    • Depression Father    • Suicide Attempts Maternal Uncle         This was way befofe i was born       Past Surgical History:  Past Surgical History:   Procedure Laterality Date   • DENTAL PROCEDURE         Problem List:  Patient Active Problem List   Diagnosis   • Gastroesophageal reflux disease   • Thrush   • Alcohol abuse       Allergy:   Allergies   Allergen Reactions   • Prozac [Fluoxetine] Other (See Comments)     agitation        Current Medications:   Current Outpatient Medications   Medication Sig Dispense Refill   • buPROPion XL (Wellbutrin XL) 150 MG 24 hr tablet Take 1 tablet by mouth Every Morning. 90 tablet 0   • hydrOXYzine pamoate (VISTARIL) 50 MG capsule Take 1 capsule by mouth 3 (Three) Times a Day As Needed for Anxiety. 90 capsule 2   • traZODone (DESYREL) 50 MG tablet Take 1 tablet by mouth Every Night. 90 tablet 0   • omeprazole (priLOSEC) 40 MG capsule Take 1 capsule by mouth Daily. 90 capsule 1     No current facility-administered medications for this visit.       Review of Systems:    Review of Systems   Psychiatric/Behavioral: Positive for positive for hyperactivity. The patient is nervous/anxious.    All other systems  reviewed and are negative.        Physical Exam:   Physical Exam  Vitals reviewed.   Constitutional:       Appearance: Normal appearance.   HENT:      Head: Normocephalic.   Neurological:      Mental Status: He is alert.   Psychiatric:         Attention and Perception: Attention and perception normal.         Mood and Affect: Affect normal. Mood is anxious.         Behavior: Behavior is hyperactive. Behavior is cooperative.         Thought Content: Thought content normal.         Cognition and Memory: Cognition and memory normal.         Judgment: Judgment normal.      Comments: Speech is rapid but not pressured         Vitals:  There were no vitals taken for this visit. There is no height or weight on file to calculate BMI.  Due to extenuating circumstances and possible current health risks associated with the patient being present in a clinical setting (with current health restrictions in place in regards to possible COVID 19 transmission/exposure), the patient was seen remotely today via a MyChart Video Visit through [facility] and telephone encounter.  Unable to obtain vital signs due to nature of remote visit.  Height stated at 66 inches.  Weight stated at 174 pounds.    Last 3 Blood Pressure Readings:  BP Readings from Last 3 Encounters:   03/13/23 132/76   09/30/22 133/83   07/26/22 121/76     PHQ-9 Depression Screening  Little interest or pleasure in doing things? (P) 0-->not at all   Feeling down, depressed, or hopeless? (P) 0-->not at all   Trouble falling or staying asleep, or sleeping too much? (P) 0-->not at all   Feeling tired or having little energy? (P) 0-->not at all   Poor appetite or overeating? (P) 0-->not at all   Feeling bad about yourself - or that you are a failure or have let yourself or your family down? (P) 1-->several days   Trouble concentrating on things, such as reading the newspaper or watching television? (P) 1-->several days   Moving or speaking so slowly that other people could have  noticed? Or the opposite - being so fidgety or restless that you have been moving around a lot more than usual? (P) 0-->not at all   Thoughts that you would be better off dead, or of hurting yourself in some way? (P) 0-->not at all   PHQ-9 Total Score (P) 2   If you checked off any problems, how difficult have these problems made it for you to do your work, take care of things at home, or get along with other people? (P) somewhat difficult     PHQ-9 Total Score: (P) 2      ARMIDA-7  Feeling nervous, anxious or on edge: (P) Nearly every day  Not being able to stop or control worrying: (P) More than half the days  Worrying too much about different things: (P) More than half the days  Trouble Relaxing: (P) Several days  Being so restless that it is hard to sit still: (P) Not at all  Feeling afraid as if something awful might happen: (P) Several days  Becoming easily annoyed or irritable: (P) Several days  ARMIDA 7 Total Score: (P) 10  If you checked any problems, how difficult have these problems made it for you to do your work, take care of things at home, or get along with other people: (P) Very difficult      PROMIS scale screening tool that patient filled out virtually prior to encounter beginning reviewed by this APRN at today's encounter.      Mental Status Exam:   Hygiene:   good  Cooperation:  Cooperative  Eye Contact:  Good  Psychomotor Behavior:  Hyperactive  Affect:  Full range  Mood: anxious  Hopelessness: Denies  Speech:  Rapid  Thought Process:  Goal directed and Linear  Thought Content:  Normal  Suicidal:  None  Homicidal:  None  Hallucinations:  None  Delusion:  None  Memory:  Intact  Orientation:  Person, Place, Time and Situation  Reliability:  good  Insight:  Good  Judgement:  Good  Impulse Control:  Good  Physical/Medical Issues:  No           Assessment & Plan   Diagnoses and all orders for this visit:    1. ARMIDA (generalized anxiety disorder) (Primary)  -     hydrOXYzine pamoate (VISTARIL) 50 MG capsule;  "Take 1 capsule by mouth 3 (Three) Times a Day As Needed for Anxiety.  Dispense: 90 capsule; Refill: 2  -     traZODone (DESYREL) 50 MG tablet; Take 1 tablet by mouth Every Night.  Dispense: 90 tablet; Refill: 0  -     buPROPion XL (Wellbutrin XL) 150 MG 24 hr tablet; Take 1 tablet by mouth Every Morning.  Dispense: 90 tablet; Refill: 0    2. Chronic alcoholism in remission        Visit Diagnoses:    ICD-10-CM ICD-9-CM   1. ARMIDA (generalized anxiety disorder)  F41.1 300.02   2. Chronic alcoholism in remission  F10.21 303.93         GOALS:  Short Term Goals: Patient will be compliant with medication, and patient will have no significant medication related side effects.  Patient will be engaged in psychotherapy as indicated.  Patient will report subjective improvement of symptoms.  Long term goals: To stabilize mood and treat/improve subjective symptoms, the patient will stay out of the hospital, the patient will be at an optimal level of functioning, and the patient will take all medications as prescribed.  The patient/guardian verbalized understanding and agreement with goals that were mutually set.    RISK ASSESSMENT  Current harm-to-self risk is reported by pt as \"none.\"  Current cgyy-ah-kbaihg risk is reported by pt as \"none.\"   No suicidal thoughts, intent, plan is appreciated by this provider on this date of exam.   No homicidal thoughts, intent, plan is appreciated by this provider on this date.    TREATMENT PLAN: Continue supportive psychotherapy efforts and medications as indicated.   Pharmacological and Non-Pharmacological treatment options discussed during today's visit. Patient/Guardian acknowledged and verbally consented with current treatment plan and was educated on the importance of compliance with treatment and follow-up appointments.      MEDICATION ISSUES:  Discussed medication options and treatment plan of prescribed medication as well as the risks, benefits, any black box warnings, and side effects " including potential falls, possible impaired driving, and metabolic adversities among others. Patient is agreeable to call the office with any worsening of symptoms or onset of side effects, or if any concerns or questions arise.  The contact information for the office is made available to the patient. Patient is agreeable to call 911 or go to the nearest ER should they begin having any SI/HI, or if any urgent concerns arise. No medication side effects or related complaints today.     Continue Wellbutrin 150 mg XL every morning  Continue trazodone 50 mg about 30 minutes prior to our sleep  Increase hydroxyzine to 50 mg capsule up to 3 times a day as needed for anxiety    MEDS ORDERED DURING VISIT:  New Medications Ordered This Visit   Medications   • hydrOXYzine pamoate (VISTARIL) 50 MG capsule     Sig: Take 1 capsule by mouth 3 (Three) Times a Day As Needed for Anxiety.     Dispense:  90 capsule     Refill:  2     Dosage change   • traZODone (DESYREL) 50 MG tablet     Sig: Take 1 tablet by mouth Every Night.     Dispense:  90 tablet     Refill:  0   • buPROPion XL (Wellbutrin XL) 150 MG 24 hr tablet     Sig: Take 1 tablet by mouth Every Morning.     Dispense:  90 tablet     Refill:  0       Follow Up Appointment:   Return in about 12 weeks (around 7/12/2023) for Recheck, Video visit.               This document has been electronically signed by GERONIMO Guadalupe  April 19, 2023 17:18 EDT    Dictated Utilizing Dragon Dictation: Part of this note may be an electronic transcription/translation of spoken language to printed text using the Dragon Dictation System.

## 2023-06-05 DIAGNOSIS — F41.1 GAD (GENERALIZED ANXIETY DISORDER): ICD-10-CM

## 2023-06-05 RX ORDER — HYDROXYZINE PAMOATE 50 MG/1
50 CAPSULE ORAL 3 TIMES DAILY PRN
Qty: 90 CAPSULE | Refills: 0 | Status: SHIPPED | OUTPATIENT
Start: 2023-06-05

## 2023-06-09 ENCOUNTER — PATIENT MESSAGE (OUTPATIENT)
Dept: FAMILY MEDICINE CLINIC | Facility: CLINIC | Age: 33
End: 2023-06-09
Payer: MEDICAID

## 2023-06-09 DIAGNOSIS — Z31.41 FERTILITY TESTING: Primary | ICD-10-CM

## 2023-06-10 NOTE — TELEPHONE ENCOUNTER
From: Gui Rivera  To: Alberto Stephens  Sent: 6/9/2023 9:10 AM CDT  Subject: Fertility Testing    My wife is getting off of her birth control next year and with me getting older by the day I wanted to make sure the swimmers are still swimming. Is there anywhere close I can go for a sperm count or fertility test or something like that? Side note, today is one year sober.

## 2023-07-27 ENCOUNTER — OFFICE VISIT (OUTPATIENT)
Dept: FAMILY MEDICINE CLINIC | Facility: CLINIC | Age: 33
End: 2023-07-27
Payer: MEDICAID

## 2023-07-27 ENCOUNTER — LAB (OUTPATIENT)
Dept: LAB | Facility: HOSPITAL | Age: 33
End: 2023-07-27
Payer: MEDICAID

## 2023-07-27 VITALS
SYSTOLIC BLOOD PRESSURE: 122 MMHG | HEIGHT: 66 IN | WEIGHT: 185.6 LBS | RESPIRATION RATE: 16 BRPM | HEART RATE: 84 BPM | DIASTOLIC BLOOD PRESSURE: 62 MMHG | BODY MASS INDEX: 29.83 KG/M2 | OXYGEN SATURATION: 98 %

## 2023-07-27 DIAGNOSIS — F10.20 ALCOHOLISM: ICD-10-CM

## 2023-07-27 DIAGNOSIS — G43.111 INTRACTABLE MIGRAINE WITH AURA WITH STATUS MIGRAINOSUS: ICD-10-CM

## 2023-07-27 DIAGNOSIS — Z00.00 ANNUAL PHYSICAL EXAM: Primary | ICD-10-CM

## 2023-07-27 DIAGNOSIS — Z00.00 LABORATORY TESTS ORDERED AS PART OF A COMPLETE PHYSICAL EXAM (CPE): ICD-10-CM

## 2023-07-27 DIAGNOSIS — K21.9 GASTROESOPHAGEAL REFLUX DISEASE, UNSPECIFIED WHETHER ESOPHAGITIS PRESENT: ICD-10-CM

## 2023-07-27 DIAGNOSIS — F41.1 GAD (GENERALIZED ANXIETY DISORDER): ICD-10-CM

## 2023-07-27 DIAGNOSIS — Z00.00 ANNUAL PHYSICAL EXAM: ICD-10-CM

## 2023-07-27 LAB
ALBUMIN SERPL-MCNC: 4.9 G/DL (ref 3.5–5.2)
ALBUMIN/GLOB SERPL: 2.7 G/DL
ALP SERPL-CCNC: 45 U/L (ref 39–117)
ALT SERPL W P-5'-P-CCNC: 20 U/L (ref 1–41)
ANION GAP SERPL CALCULATED.3IONS-SCNC: 9 MMOL/L (ref 5–15)
AST SERPL-CCNC: 20 U/L (ref 1–40)
BASOPHILS # BLD AUTO: 0.05 10*3/MM3 (ref 0–0.2)
BASOPHILS NFR BLD AUTO: 1 % (ref 0–1.5)
BILIRUB SERPL-MCNC: <0.2 MG/DL (ref 0–1.2)
BUN SERPL-MCNC: 18 MG/DL (ref 6–20)
BUN/CREAT SERPL: 18.9 (ref 7–25)
CALCIUM SPEC-SCNC: 9.5 MG/DL (ref 8.6–10.5)
CHLORIDE SERPL-SCNC: 104 MMOL/L (ref 98–107)
CHOLEST SERPL-MCNC: 201 MG/DL (ref 0–200)
CO2 SERPL-SCNC: 26 MMOL/L (ref 22–29)
CREAT SERPL-MCNC: 0.95 MG/DL (ref 0.76–1.27)
DEPRECATED RDW RBC AUTO: 36.9 FL (ref 37–54)
EGFRCR SERPLBLD CKD-EPI 2021: 110.4 ML/MIN/1.73
EOSINOPHIL # BLD AUTO: 0.19 10*3/MM3 (ref 0–0.4)
EOSINOPHIL NFR BLD AUTO: 3.7 % (ref 0.3–6.2)
ERYTHROCYTE [DISTWIDTH] IN BLOOD BY AUTOMATED COUNT: 12.3 % (ref 12.3–15.4)
GLOBULIN UR ELPH-MCNC: 1.8 GM/DL
GLUCOSE SERPL-MCNC: 104 MG/DL (ref 65–99)
HBA1C MFR BLD: 5.8 % (ref 4.8–5.6)
HCT VFR BLD AUTO: 41.8 % (ref 37.5–51)
HCV AB SER DONR QL: NORMAL
HDLC SERPL-MCNC: 34 MG/DL (ref 40–60)
HGB BLD-MCNC: 13.1 G/DL (ref 13–17.7)
IMM GRANULOCYTES # BLD AUTO: 0.02 10*3/MM3 (ref 0–0.05)
IMM GRANULOCYTES NFR BLD AUTO: 0.4 % (ref 0–0.5)
LDLC SERPL CALC-MCNC: 153 MG/DL (ref 0–100)
LDLC/HDLC SERPL: 4.45 {RATIO}
LYMPHOCYTES # BLD AUTO: 2.05 10*3/MM3 (ref 0.7–3.1)
LYMPHOCYTES NFR BLD AUTO: 39.4 % (ref 19.6–45.3)
MCH RBC QN AUTO: 26.2 PG (ref 26.6–33)
MCHC RBC AUTO-ENTMCNC: 31.3 G/DL (ref 31.5–35.7)
MCV RBC AUTO: 83.6 FL (ref 79–97)
MONOCYTES # BLD AUTO: 0.61 10*3/MM3 (ref 0.1–0.9)
MONOCYTES NFR BLD AUTO: 11.7 % (ref 5–12)
NEUTROPHILS NFR BLD AUTO: 2.28 10*3/MM3 (ref 1.7–7)
NEUTROPHILS NFR BLD AUTO: 43.8 % (ref 42.7–76)
NRBC BLD AUTO-RTO: 0 /100 WBC (ref 0–0.2)
PLATELET # BLD AUTO: 285 10*3/MM3 (ref 140–450)
PMV BLD AUTO: 9 FL (ref 6–12)
POTASSIUM SERPL-SCNC: 4.5 MMOL/L (ref 3.5–5.2)
PROT SERPL-MCNC: 6.7 G/DL (ref 6–8.5)
RBC # BLD AUTO: 5 10*6/MM3 (ref 4.14–5.8)
SODIUM SERPL-SCNC: 139 MMOL/L (ref 136–145)
T4 FREE SERPL-MCNC: 1.04 NG/DL (ref 0.93–1.7)
TRIGL SERPL-MCNC: 79 MG/DL (ref 0–150)
TSH SERPL DL<=0.05 MIU/L-ACNC: 1.11 UIU/ML (ref 0.27–4.2)
VLDLC SERPL-MCNC: 14 MG/DL (ref 5–40)
WBC NRBC COR # BLD: 5.2 10*3/MM3 (ref 3.4–10.8)

## 2023-07-27 PROCEDURE — 85025 COMPLETE CBC W/AUTO DIFF WBC: CPT

## 2023-07-27 PROCEDURE — 84443 ASSAY THYROID STIM HORMONE: CPT

## 2023-07-27 PROCEDURE — 36415 COLL VENOUS BLD VENIPUNCTURE: CPT

## 2023-07-27 PROCEDURE — 84439 ASSAY OF FREE THYROXINE: CPT

## 2023-07-27 PROCEDURE — 80061 LIPID PANEL: CPT

## 2023-07-27 PROCEDURE — 80053 COMPREHEN METABOLIC PANEL: CPT

## 2023-07-27 PROCEDURE — 83036 HEMOGLOBIN GLYCOSYLATED A1C: CPT

## 2023-07-27 PROCEDURE — 86803 HEPATITIS C AB TEST: CPT

## 2023-07-27 NOTE — PROGRESS NOTES
GERONIMO Santos  [facility]   Family Medicine  [address]  [place] [address]  Phone: [phone]  Fax: [phone]         Chief Complaint:  Chief Complaint   Patient presents with    Follow-up        History:  Gui Rivera is a 32 y.o. male that is an established patient. He  is here for evaluation of the above complaint and management of chronic conditions.    HPI     Gui Rivera is here in return for yearly physical and evaluation of headaches and migraines, onset about 6 months ago. He has headaches about every other day, relieved by caffeine and ibuprofen. The headaches are usually in the frontal portion of his head, sometimes wrap around the sides. He drinks one cup of coffee in the morning and one soda in the afternoon.    About once every 1-2 months he has a migraine with the pain located bilaterally behind the eyes, photosensitivity, can cause nausea and has taken him to  for Toradol injection, which helped. We discussed both PRN medications as well as a preventitive to take daily. He elects PRN medications at this time. He has had headaches/migraines in the past, but it had been awhile.     He's over a year sober, attends AA meetings, sees psychiatry and is doing well on Wellbutrin, Vistaril, Trazodone at night and Omeprazole. Family members who are also alcoholics are also in recover the last year or so.    He has an order for sperm count, as he and his wife are considering conception in the next year.               ROS:  Review of Systems   Constitutional: Negative.    Eyes: Negative.    Respiratory: Negative.     Cardiovascular: Negative.    Gastrointestinal: Negative.    Endocrine: Negative.    Genitourinary: Negative.    Musculoskeletal: Negative.    Skin: Negative.    Allergic/Immunologic: Negative.    Neurological:  Positive for headaches.   Hematological: Negative.    Psychiatric/Behavioral: Negative.         reports that he has been smoking electronic  "cigarette. He has never used smokeless tobacco. He reports that he does not currently use alcohol. He reports that he does not currently use drugs after having used the following drugs: Benzodiazepines, Cocaine(coke), Hashish, Ketamine, Marijuana, and MDMA (ecstacy). Frequency: 7.00 times per week.    Current Outpatient Medications   Medication Instructions    buPROPion XL (WELLBUTRIN XL) 150 mg, Oral, Every Morning    hydrOXYzine pamoate (VISTARIL) 25 mg, Oral, 4 Times Daily PRN    omeprazole (PRILOSEC) 40 mg, Oral, Daily    traZODone (DESYREL) 50 mg, Oral, Nightly    ubrogepant (UBRELVY) 100 mg, Oral, Once As Needed       OBJECTIVE:  /62 (BP Location: Right arm, Patient Position: Sitting, Cuff Size: Adult)   Pulse 84   Resp 16   Ht 167.6 cm (65.98\")   Wt 84.2 kg (185 lb 9.6 oz)   SpO2 98%   BMI 29.97 kg/m²    Physical Exam  Constitutional:       Appearance: Normal appearance.   HENT:      Head: Normocephalic and atraumatic.      Right Ear: Tympanic membrane, ear canal and external ear normal.      Left Ear: Tympanic membrane, ear canal and external ear normal.      Nose: Nose normal.      Mouth/Throat:      Mouth: Mucous membranes are moist.      Pharynx: Oropharynx is clear.   Eyes:      Extraocular Movements: Extraocular movements intact.      Conjunctiva/sclera: Conjunctivae normal.      Pupils: Pupils are equal, round, and reactive to light.   Cardiovascular:      Rate and Rhythm: Normal rate and regular rhythm.      Pulses: Normal pulses.      Heart sounds: Normal heart sounds.   Pulmonary:      Effort: Pulmonary effort is normal.      Breath sounds: Normal breath sounds.   Abdominal:      General: Bowel sounds are normal.      Palpations: Abdomen is soft.   Musculoskeletal:         General: Normal range of motion.      Cervical back: Normal range of motion and neck supple.   Skin:     General: Skin is warm and dry.      Capillary Refill: Capillary refill takes 2 to 3 seconds.   Neurological:      " General: No focal deficit present.      Mental Status: He is alert and oriented to person, place, and time.   Psychiatric:         Mood and Affect: Mood normal.         Behavior: Behavior normal.         Thought Content: Thought content normal.       Procedures    Assessment/Plan:     Diagnoses and all orders for this visit:    1. Annual physical exam (Primary)  -     Comprehensive Metabolic Panel; Future  -     Lipid Panel; Future  -     Hemoglobin A1c; Future  -     TSH; Future  -     CBC & Differential; Future  -     T4, Free; Future  -     Hepatitis C antibody; Future    2. Laboratory tests ordered as part of a complete physical exam (CPE)  -     Comprehensive Metabolic Panel; Future  -     Lipid Panel; Future  -     Hemoglobin A1c; Future  -     TSH; Future  -     CBC & Differential; Future  -     T4, Free; Future  -     Hepatitis C antibody; Future    3. Intractable migraine with aura with status migrainosus  -     ubrogepant (UBRELVY) 100 MG tablet; Take 1 tablet by mouth 1 (One) Time As Needed (May repeat in 2 hours if needed) for up to 1 dose.  Dispense: 30 tablet; Refill: 1    4. Alcoholism    5. ARMIDA (generalized anxiety disorder)    6. Gastroesophageal reflux disease, unspecified whether esophagitis present      BMI is >= 25 and <30. (Overweight) The following options were offered after discussion;: exercise counseling/recommendations and nutrition counseling/recommendations       An After Visit Summary was printed and given to the patient at discharge.  Return in about 6 months (around 1/27/2024) for follow up migraines.       Patient Instructions   Ubrelvy 100mg, 1 by mouth at the onset of migraine. May repeat in 2 hours  Lab order      Discussion:     We will follow up in 6 months regarding headaches    I spent 30 minutes caring for Gui on this date of service. This time includes time spent by me in the following activities: preparing for the visit, reviewing tests, obtaining and/or reviewing a  separately obtained history, performing a medically appropriate examination and/or evaluation, counseling and educating the patient/family/caregiver, ordering medications, tests, or procedures, and documenting information in the medical record     Eladia MELTON 7/27/2023   Electronically signed.

## 2023-09-11 ENCOUNTER — PATIENT MESSAGE (OUTPATIENT)
Dept: FAMILY MEDICINE CLINIC | Facility: CLINIC | Age: 33
End: 2023-09-11

## 2023-09-11 DIAGNOSIS — G43.111 INTRACTABLE MIGRAINE WITH AURA WITH STATUS MIGRAINOSUS: ICD-10-CM

## 2023-09-11 DIAGNOSIS — F41.1 GAD (GENERALIZED ANXIETY DISORDER): ICD-10-CM

## 2023-09-11 DIAGNOSIS — K21.9 GASTROESOPHAGEAL REFLUX DISEASE, UNSPECIFIED WHETHER ESOPHAGITIS PRESENT: ICD-10-CM

## 2023-09-11 RX ORDER — HYDROXYZINE PAMOATE 25 MG/1
25 CAPSULE ORAL 4 TIMES DAILY PRN
Qty: 120 CAPSULE | Refills: 2 | Status: CANCELLED | OUTPATIENT
Start: 2023-09-11

## 2023-09-11 RX ORDER — BUPROPION HYDROCHLORIDE 150 MG/1
150 TABLET ORAL EVERY MORNING
Qty: 90 TABLET | Refills: 0 | Status: CANCELLED | OUTPATIENT
Start: 2023-09-11

## 2023-09-12 RX ORDER — OMEPRAZOLE 40 MG/1
40 CAPSULE, DELAYED RELEASE ORAL DAILY
Qty: 90 CAPSULE | Refills: 1 | Status: SHIPPED | OUTPATIENT
Start: 2023-09-12

## 2023-09-12 NOTE — TELEPHONE ENCOUNTER
90 days' of these scripts were sent in 2 mos ago to [facility]. Has he contacted the pharmacy?

## 2023-09-12 NOTE — TELEPHONE ENCOUNTER
Rx Refill Note  Requested Prescriptions     Pending Prescriptions Disp Refills    omeprazole (priLOSEC) 40 MG capsule 90 capsule 1     Sig: Take 1 capsule by mouth Daily.      Last office visit with prescribing clinician: 7/26/2022   Last telemedicine visit with prescribing clinician: Visit date not found   Next office visit with prescribing clinician: 1/29/2024                         Would you like a call back once the refill request has been completed: [] Yes [] No    If the office needs to give you a call back, can they leave a voicemail: [] Yes [] No    Maria Victoria Vieira MA  09/12/23, 08:40 CDT

## 2023-09-14 RX ORDER — HYDROXYZINE PAMOATE 25 MG/1
25 CAPSULE ORAL 4 TIMES DAILY PRN
Qty: 120 CAPSULE | Refills: 2 | Status: SHIPPED | OUTPATIENT
Start: 2023-09-14

## 2023-09-14 RX ORDER — TRAZODONE HYDROCHLORIDE 50 MG/1
50 TABLET ORAL NIGHTLY
Qty: 90 TABLET | Refills: 0 | Status: SHIPPED | OUTPATIENT
Start: 2023-09-14

## 2023-09-14 RX ORDER — BUPROPION HYDROCHLORIDE 150 MG/1
150 TABLET ORAL EVERY MORNING
Qty: 90 TABLET | Refills: 0 | Status: SHIPPED | OUTPATIENT
Start: 2023-09-14

## 2023-09-14 NOTE — TELEPHONE ENCOUNTER
From: Gui Rivera  To: Alberto Stephens  Sent: 9/11/2023 6:42 PM CDT  Subject: Refill requests    I sent some prescription refills over, some of them are a bit early but my insurance is changing after this month and they will cost more if I wait, I hope that is ok

## 2023-10-11 ENCOUNTER — TELEMEDICINE (OUTPATIENT)
Dept: PSYCHIATRY | Facility: CLINIC | Age: 33
End: 2023-10-11

## 2023-10-11 DIAGNOSIS — F41.1 GAD (GENERALIZED ANXIETY DISORDER): Primary | ICD-10-CM

## 2023-10-11 DIAGNOSIS — F10.21 CHRONIC ALCOHOLISM IN REMISSION: ICD-10-CM

## 2023-10-11 RX ORDER — BUPROPION HYDROCHLORIDE 150 MG/1
150 TABLET ORAL EVERY MORNING
Qty: 90 TABLET | Refills: 0 | Status: SHIPPED | OUTPATIENT
Start: 2023-10-11

## 2023-10-11 RX ORDER — HYDROXYZINE PAMOATE 25 MG/1
25 CAPSULE ORAL 4 TIMES DAILY PRN
Qty: 120 CAPSULE | Refills: 2 | Status: SHIPPED | OUTPATIENT
Start: 2023-10-11

## 2023-10-11 RX ORDER — TRAZODONE HYDROCHLORIDE 50 MG/1
50 TABLET ORAL NIGHTLY
Qty: 90 TABLET | Refills: 0 | Status: SHIPPED | OUTPATIENT
Start: 2023-10-11

## 2023-10-11 NOTE — LETTER
[facility]  [address] [place] [address]  [phone]  Dept: [phone]    10/11/23    RE:   Gui Rivera   :  1990    To Whom It May Concern:    This letter is to verify that Gui had appt with undersigned on 10/11/2023 at 230 PM CDT.         Sincerely,       GERONIMO Guadalupe, PMHNP-BC

## 2023-10-11 NOTE — PATIENT INSTRUCTIONS
For concerns or needing assistance call the [facility] at [phone]  Continue Wellbutrin 150 mg XL every morning  Continue trazodone 50 mg about 30 minutes prior to our sleep  Hydroxyzine changed to 25 mg capsule which he can take up to 4 times a day as needed for anxiety.  He is also instructed if he feels like he needs to take 2 at 1 time that is okay.  Letter for work sent to [employer].

## 2023-10-11 NOTE — PROGRESS NOTES
"This provider is located at [facility], [address] [place] [address], [facility], [id number] using a secure MyChart Video Visit through [employer]. Patient is being seen remotely via telehealth at their home address in [place], and stated they are in a secure environment for this session. The patient's condition being diagnosed/treated is appropriate for telemedicine. The provider identified herself as well as her credentials.   The patient, and/or patients guardian, consent to be seen remotely, and when consent is given they understand that the consent allows for patient identifiable information to be sent to a third party as needed.   They may refuse to be seen remotely at any time. The electronic data is encrypted and password protected, and the patient and/or guardian has been advised of the potential risks to privacy not withstanding such measures.   PT Identifiers used: Name and .    You have chosen to receive care through a telehealth visit.  Do you consent to use a video/audio connection for your medical care today? Yes      Subjective   Gui Rivera is a 33 y.o. male who presents today for follow up    Chief Complaint: \"Continue with sobriety, anxiety improved.\"    History of Present Illness:    History of Present Illness  Patient reports he feels like he is doing very well.  Mood and anxiety overall are maintained with medication management and going to support group meetings.  Patient has sponsor for [employer], Mario, and reports that they are doing really well and the patient continues to attend AA meetings and gleans a lot from this.  Remains gainfully employed.  Everything at work is good, everything at home is good.  He does notice if he forgets to take the Wellbutrin he can become more irritable that day.  He still uses a Medi planner.  Trazodone is effective to help him sleep if he needs it.          The following portions of the patient's history were reviewed and updated as appropriate: allergies, current " "medications, past family history, past medical history, past social history, past surgical history and problem list.    Past Psychiatric History:  Patient reports being diagnosed with attention deficit when he was a child and was prescribed Adderall and Vyvanse but he did not take the medicines consistently because they made him \"feel like a zombie.\"  History of substance abuse, sobriety date June 9, 2022. 1 admission for rehab in  [facility].  Denies any history of suicide attempt, no history of self-harm behaviors.  Reports he was trialed on Prozac and had abnormal response to this, made him more aggressive.       Past Medical History:  Past Medical History:   Diagnosis Date    ADHD (attention deficit hyperactivity disorder)     Alcohol abuse     Alcoholism     Anxiety     Depression     Wrist fracture     right       Substance Abuse History:   Types: Patient have extensive history of substance abuse, reports sobriety date of June 9, 2022.  attending AA, current sponsor is named Mario. reports history of heavy alcohol use, and also used many other drugs in the past including cocaine, marijuana, benzodiazepines.  Withdrawal Symptoms: Denied any history of withdrawal seizures, but reports was having hallucinations with the last time that he heavily drank alcohol.      Social History:  Social History     Socioeconomic History    Marital status:     Number of children: 0    Highest education level: Some college, no degree   Tobacco Use    Smoking status: Every Day     Types: Electronic Cigarette    Smokeless tobacco: Never   Vaping Use    Vaping Use: Every day    Substances: Nicotine   Substance and Sexual Activity    Alcohol use: Not Currently     Comment: previously heavy use    Drug use: Not Currently     Frequency: 7.0 times per week     Types: Benzodiazepines, Cocaine(coke), Hashish, Ketamine, Marijuana, MDMA (ecstacy)    Sexual activity: Yes     Partners: Female     Birth " control/protection: Implant   Patient reported good support system with his wife and sponsors.  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  Denies any history of legal problems, denies any history of  service.  Endorses non Caodaism spiritual beliefs.    Family History:  Family History   Problem Relation Age of Onset    Alcohol abuse Maternal Uncle     Alcohol abuse Maternal Grandmother     Heart attack Maternal Grandmother     Diabetes Paternal Grandmother     Alcohol abuse Cousin     Depression Father     Suicide Attempts Maternal Uncle         This was way befofe i was born       Past Surgical History:  Past Surgical History:   Procedure Laterality Date    DENTAL PROCEDURE         Problem List:  Patient Active Problem List   Diagnosis    Gastroesophageal reflux disease    Thrush    Alcohol abuse       Allergy:   Allergies   Allergen Reactions    Prozac [Fluoxetine] Other (See Comments)     agitation        Current Medications:   Current Outpatient Medications   Medication Sig Dispense Refill    buPROPion XL (Wellbutrin XL) 150 MG 24 hr tablet Take 1 tablet by mouth Every Morning. 90 tablet 0    hydrOXYzine pamoate (VISTARIL) 25 MG capsule Take 1 capsule by mouth 4 (Four) Times a Day As Needed for Anxiety. 120 capsule 2    traZODone (DESYREL) 50 MG tablet Take 1 tablet by mouth Every Night. 90 tablet 0    omeprazole (priLOSEC) 40 MG capsule Take 1 capsule by mouth Daily. 90 capsule 1    ubrogepant (UBRELVY) 100 MG tablet Take 1 tablet by mouth 1 (One) Time As Needed (May repeat in 2 hours if needed). 10 tablet 2     No current facility-administered medications for this visit.       Review of Systems:    Review of Systems   Psychiatric/Behavioral:  Positive for positive for hyperactivity.    All other systems reviewed and are negative.        Physical Exam:   Physical Exam  Vitals reviewed.   Constitutional:       Appearance: Normal appearance.   HENT:      Head:  Normocephalic.   Neurological:      Mental Status: He is alert.   Psychiatric:         Attention and Perception: Attention and perception normal.         Mood and Affect: Mood and affect normal.         Behavior: Behavior is hyperactive. Behavior is cooperative.         Thought Content: Thought content normal.         Cognition and Memory: Cognition and memory normal.         Judgment: Judgment normal.      Comments: Speech is rapid but not pressured         Vitals:  There were no vitals taken for this visit. There is no height or weight on file to calculate BMI.  Due to extenuating circumstances and possible current health risks associated with the patient being present in a clinical setting (with current health restrictions in place in regards to possible COVID 19 transmission/exposure), the patient was seen remotely today via a MyChart Video Visit through [facility] and telephone encounter.  Unable to obtain vital signs due to nature of remote visit.  Height stated at 66 inches.  Weight stated at 180pounds.    Last 3 Blood Pressure Readings:  BP Readings from Last 3 Encounters:   07/27/23 122/62   06/29/23 121/76   03/13/23 132/76          Mental Status Exam:   Hygiene:   good  Cooperation:  Cooperative  Eye Contact:  Good  Psychomotor Behavior:  Hyperactive  Affect:  Full range  Mood: euthymic  Hopelessness: Denies  Speech:  Rapid and this appears to be his baseline  Thought Process:  Goal directed and Linear  Thought Content:  Normal  Suicidal:  None  Homicidal:  None  Hallucinations:  None  Delusion:  None  Memory:  Intact  Orientation:  Person, Place, Time and Situation  Reliability:  good  Insight:  Good  Judgement:  Good  Impulse Control:  Good  Physical/Medical Issues:  No           Assessment & Plan   Diagnoses and all orders for this visit:    1. ARMIDA (generalized anxiety disorder) (Primary)  -     traZODone (DESYREL) 50 MG tablet; Take 1 tablet by mouth Every Night.  Dispense: 90 tablet; Refill: 0  -      "hydrOXYzine pamoate (VISTARIL) 25 MG capsule; Take 1 capsule by mouth 4 (Four) Times a Day As Needed for Anxiety.  Dispense: 120 capsule; Refill: 2  -     buPROPion XL (Wellbutrin XL) 150 MG 24 hr tablet; Take 1 tablet by mouth Every Morning.  Dispense: 90 tablet; Refill: 0    2. Chronic alcoholism in remission    Sobriety date June 09, 2022    Visit Diagnoses:    ICD-10-CM ICD-9-CM   1. ARMIDA (generalized anxiety disorder)  F41.1 300.02   2. Chronic alcoholism in remission  F10.21 303.93           GOALS:  Short Term Goals: Patient will be compliant with medication, and patient will have no significant medication related side effects.  Patient will be engaged in psychotherapy as indicated.  Patient will report subjective improvement of symptoms.  Long term goals: To stabilize mood and treat/improve subjective symptoms, the patient will stay out of the hospital, the patient will be at an optimal level of functioning, and the patient will take all medications as prescribed.  The patient/guardian verbalized understanding and agreement with goals that were mutually set.    RISK ASSESSMENT  Current harm-to-self risk is reported by pt as \"none.\"  Current [id number]-[id number] risk is reported by pt as \"none.\"   No suicidal thoughts, intent, plan is appreciated by this provider on this date of exam.   No homicidal thoughts, intent, plan is appreciated by this provider on this date.    TREATMENT PLAN: Continue supportive psychotherapy efforts and medications as indicated.   Pharmacological and Non-Pharmacological treatment options discussed during today's visit. Patient/Guardian acknowledged and verbally consented with current treatment plan and was educated on the importance of compliance with treatment and follow-up appointments.      MEDICATION ISSUES:  Discussed medication options and treatment plan of prescribed medication as well as the risks, benefits, any black box warnings, and side effects including potential falls, " possible impaired driving, and metabolic adversities among others. Patient is agreeable to call the office with any worsening of symptoms or onset of side effects, or if any concerns or questions arise.  The contact information for the office is made available to the patient. Patient is agreeable to call 911 or go to the nearest ER should they begin having any SI/HI, or if any urgent concerns arise. No medication side effects or related complaints today.     Continue Wellbutrin 150 mg XL every morning  Continue trazodone 50 mg about 30 minutes prior to our sleep  Hydroxyzine changed to 25 mg capsule which he can take up to 4 times a day as needed for anxiety.  He is also instructed if he feels like he needs to take 2 at 1 time that is okay.  Letter for work sent to [facility].    [facility]:  New Medications Ordered This Visit   Medications    traZODone (DESYREL) 50 MG tablet     Sig: Take 1 tablet by mouth Every Night.     Dispense:  90 tablet     Refill:  0     Keep on file for future fill    hydrOXYzine pamoate (VISTARIL) 25 MG capsule     Sig: Take 1 capsule by mouth 4 (Four) Times a Day As Needed for Anxiety.     Dispense:  120 capsule     Refill:  2     Keep on file for future fill    buPROPion XL (Wellbutrin XL) 150 MG 24 hr tablet     Sig: Take 1 tablet by mouth Every Morning.     Dispense:  90 tablet     Refill:  0     Keep on file for future fill       Follow Up Appointment:   Return in about 6 months (around 4/11/2024).       Follow-up 6 mos        This document has been electronically signed by GERONIMO Guadalupe  October 13, 2023 11:02 EDT    Dictated Utilizing Dragon Dictation: Part of this note may be an electronic transcription/translation of spoken language to printed text using the Dragon Dictation System.

## 2024-01-09 ENCOUNTER — PATIENT MESSAGE (OUTPATIENT)
Dept: FAMILY MEDICINE CLINIC | Facility: CLINIC | Age: 34
End: 2024-01-09
Payer: COMMERCIAL

## 2024-01-12 NOTE — TELEPHONE ENCOUNTER
From: Gui Rivera  To: Alberto Stephens  Sent: 1/9/2024 9:34 AM CST  Subject: Stopping wellbutrin    I'm at a much better point in my life and feeling pretty good about everything, I was wondering if there was a safe way to ween off of wellbutrin to see how I do without it.

## 2024-01-29 ENCOUNTER — OFFICE VISIT (OUTPATIENT)
Dept: FAMILY MEDICINE CLINIC | Facility: CLINIC | Age: 34
End: 2024-01-29
Payer: COMMERCIAL

## 2024-01-29 VITALS
DIASTOLIC BLOOD PRESSURE: 96 MMHG | RESPIRATION RATE: 18 BRPM | SYSTOLIC BLOOD PRESSURE: 145 MMHG | WEIGHT: 196 LBS | TEMPERATURE: 97.1 F | BODY MASS INDEX: 31.5 KG/M2 | HEIGHT: 66 IN | HEART RATE: 91 BPM | OXYGEN SATURATION: 97 %

## 2024-01-29 DIAGNOSIS — G47.00 INSOMNIA, UNSPECIFIED TYPE: ICD-10-CM

## 2024-01-29 DIAGNOSIS — F41.1 GAD (GENERALIZED ANXIETY DISORDER): Primary | ICD-10-CM

## 2024-01-29 DIAGNOSIS — E66.9 OBESITY (BMI 30.0-34.9): ICD-10-CM

## 2024-01-29 RX ORDER — TRAZODONE HYDROCHLORIDE 50 MG/1
50 TABLET ORAL NIGHTLY
Qty: 90 TABLET | Refills: 0 | Status: SHIPPED | OUTPATIENT
Start: 2024-01-29

## 2024-01-29 NOTE — PROGRESS NOTES
"Chief Complaint  Follow-up (6 month follow up - pt states coming off welbutrin was feeling really good and wanted to try without it little irritable every now and then, pt stated he has not been able to sleep since coming off medication,  every now will get a tapping in ear and feels like something is in there. )    Subjective        Gui iRvera presents to [facility]  History of Present Illness  Patient feels like his mood is overall been relatively stable with Wellbutrin but his insomnia has not been controlled with trazodone 50 mg daily.  Most of his difficulty with sleeping is persistent thoughts and excessive anxiety.  Gets a tapping in his ear usually with certain position changes its only happened a couple of times and changes with repositioning without other distress.  Still sober    Objective   Vital Signs:  /96   Pulse 91   Temp 97.1 °F (36.2 °C)   Resp 18   Ht 167.6 cm (65.98\")   Wt 88.9 kg (196 lb)   SpO2 97%   BMI 31.65 kg/m²   Estimated body mass index is 31.65 kg/m² as calculated from the following:    Height as of this encounter: 167.6 cm (65.98\").    Weight as of this encounter: 88.9 kg (196 lb).               Physical Exam  Vitals and nursing note reviewed.   Constitutional:       General: He is not in acute distress.     Appearance: He is not diaphoretic.   HENT:      Head: Normocephalic and atraumatic.      Nose: Nose normal.   Eyes:      General: No scleral icterus.        Right eye: No discharge.         Left eye: No discharge.      Conjunctiva/sclera: Conjunctivae normal.   Neck:      Trachea: No tracheal deviation.   Pulmonary:      Effort: Pulmonary effort is normal.   Skin:     General: Skin is warm and dry.      Coloration: Skin is not pale.   Neurological:      Mental Status: He is alert and oriented to person, place, and time.   Psychiatric:         Mood and Affect: Mood is anxious.         Behavior: Behavior normal.         Thought " Content: Thought content normal.         Judgment: Judgment normal.        Result Review :                     Assessment and Plan     Diagnoses and all orders for this visit:    1. ARMIDA (generalized anxiety disorder) (Primary)    2. Obesity (BMI 30.0-34.9)    3. Insomnia, unspecified type  -     traZODone (DESYREL) 50 MG tablet; Take 1 tablet by mouth Every Night.  Dispense: 90 tablet; Refill: 0    Encourage exercise refill trazodone, discussed titration up to 150 mg  Consider resuming Wellbutrin as uncontrolled anxiety during the day may be disrupting sleep  Follow-up 3 months         Follow Up     Return in about 3 months (around 4/29/2024) for Annual physical.  Patient was given instructions and counseling regarding his condition or for health maintenance advice. Please see specific information pulled into the AVS if appropriate.

## 2024-04-17 DIAGNOSIS — K21.9 GASTROESOPHAGEAL REFLUX DISEASE, UNSPECIFIED WHETHER ESOPHAGITIS PRESENT: ICD-10-CM

## 2024-04-17 DIAGNOSIS — G47.00 INSOMNIA, UNSPECIFIED TYPE: ICD-10-CM

## 2024-04-17 RX ORDER — OMEPRAZOLE 40 MG/1
40 CAPSULE, DELAYED RELEASE ORAL DAILY
Qty: 90 CAPSULE | Refills: 1 | Status: SHIPPED | OUTPATIENT
Start: 2024-04-17

## 2024-04-17 RX ORDER — OMEPRAZOLE 40 MG/1
40 CAPSULE, DELAYED RELEASE ORAL DAILY
Qty: 90 CAPSULE | Refills: 1 | Status: SHIPPED | OUTPATIENT
Start: 2024-04-17 | End: 2024-04-17 | Stop reason: SDUPTHER

## 2024-04-17 RX ORDER — TRAZODONE HYDROCHLORIDE 50 MG/1
50 TABLET ORAL NIGHTLY
Qty: 90 TABLET | Refills: 0 | Status: SHIPPED | OUTPATIENT
Start: 2024-04-17

## 2024-07-19 DIAGNOSIS — G47.00 INSOMNIA, UNSPECIFIED TYPE: ICD-10-CM

## 2024-07-19 RX ORDER — TRAZODONE HYDROCHLORIDE 100 MG/1
100 TABLET ORAL NIGHTLY
Qty: 90 TABLET | Refills: 2 | Status: SHIPPED | OUTPATIENT
Start: 2024-07-19

## 2025-01-13 ENCOUNTER — OFFICE VISIT (OUTPATIENT)
Dept: FAMILY MEDICINE CLINIC | Facility: CLINIC | Age: 35
End: 2025-01-13

## 2025-01-13 VITALS
HEIGHT: 66 IN | BODY MASS INDEX: 32.24 KG/M2 | TEMPERATURE: 97.8 F | SYSTOLIC BLOOD PRESSURE: 130 MMHG | HEART RATE: 90 BPM | WEIGHT: 200.6 LBS | DIASTOLIC BLOOD PRESSURE: 110 MMHG | OXYGEN SATURATION: 98 %

## 2025-01-13 DIAGNOSIS — H61.22 HEARING LOSS DUE TO CERUMEN IMPACTION, LEFT: Primary | ICD-10-CM

## 2025-01-13 PROCEDURE — 99213 OFFICE O/P EST LOW 20 MIN: CPT | Performed by: FAMILY MEDICINE

## 2025-01-13 PROCEDURE — 69209 REMOVE IMPACTED EAR WAX UNI: CPT | Performed by: FAMILY MEDICINE

## 2025-01-13 NOTE — PROGRESS NOTES
"Chief Complaint  Earache (Patient presents to clinic with c/o left ear pain. Patient states it started with a feeling as if there was water in his ear. Patient states he is also having left sided jaw pain./Ongoing for 1-2 weeks/Patient has tried at home remedies as in diluted alcohol, OTC Tylenol for the pain)    Subjective        Gui Rivera presents to [facility]  History of Present Illness  2 weeks of left-sided ear pain and congestion without fever or chills, associated with hearing loss    Objective   Vital Signs:  BP (!) 130/110 (BP Location: Right arm, Patient Position: Sitting, Cuff Size: Adult)   Pulse 90   Temp 97.8 °F (36.6 °C) (Temporal)   Ht 167.6 cm (66\")   Wt 91 kg (200 lb 9.6 oz)   SpO2 98%   BMI 32.38 kg/m²   Estimated body mass index is 32.38 kg/m² as calculated from the following:    Height as of this encounter: 167.6 cm (66\").    Weight as of this encounter: 91 kg (200 lb 9.6 oz).          Physical Exam  Vitals and nursing note reviewed.   Constitutional:       General: He is not in acute distress.     Appearance: He is not diaphoretic.   HENT:      Head: Normocephalic and atraumatic.      Right Ear: Tympanic membrane normal.      Left Ear: Tympanic membrane normal. There is impacted cerumen.      Nose: Nose normal.   Eyes:      General: No scleral icterus.        Right eye: No discharge.         Left eye: No discharge.      Conjunctiva/sclera: Conjunctivae normal.   Neck:      Trachea: No tracheal deviation.   Pulmonary:      Effort: Pulmonary effort is normal.   Skin:     General: Skin is warm and dry.      Coloration: Skin is not pale.   Neurological:      Mental Status: He is alert and oriented to person, place, and time.   Psychiatric:         Behavior: Behavior normal.         Thought Content: Thought content normal.         Judgment: Judgment normal.        Result Review :                Assessment and Plan   Diagnoses and all orders for this " visit:    1. Hearing loss due to cerumen impaction, left (Primary)    TM normal after irrigation         Follow Up   Return if symptoms worsen or fail to improve, for Recheck.  Patient was given instructions and counseling regarding his condition or for health maintenance advice. Please see specific information pulled into the AVS if appropriate.

## 2025-02-13 ENCOUNTER — OFFICE VISIT (OUTPATIENT)
Dept: FAMILY MEDICINE CLINIC | Facility: CLINIC | Age: 35
End: 2025-02-13

## 2025-02-13 VITALS
TEMPERATURE: 97 F | BODY MASS INDEX: 32.72 KG/M2 | SYSTOLIC BLOOD PRESSURE: 120 MMHG | HEART RATE: 97 BPM | HEIGHT: 66 IN | OXYGEN SATURATION: 98 % | WEIGHT: 203.6 LBS | DIASTOLIC BLOOD PRESSURE: 80 MMHG

## 2025-02-13 DIAGNOSIS — F41.1 GAD (GENERALIZED ANXIETY DISORDER): ICD-10-CM

## 2025-02-13 DIAGNOSIS — E66.811 OBESITY (BMI 30.0-34.9): ICD-10-CM

## 2025-02-13 DIAGNOSIS — G47.00 INSOMNIA, UNSPECIFIED TYPE: ICD-10-CM

## 2025-02-13 DIAGNOSIS — Z00.00 ANNUAL PHYSICAL EXAM: Primary | ICD-10-CM

## 2025-02-13 DIAGNOSIS — K21.9 GASTROESOPHAGEAL REFLUX DISEASE, UNSPECIFIED WHETHER ESOPHAGITIS PRESENT: ICD-10-CM

## 2025-02-13 NOTE — PROGRESS NOTES
"Chief Complaint  Annual Exam (Patient presents to clinic for annual exam)    Subjective        Gui Rivera presents to [facility]  History of Present Illness  Here for annual exam  Going 3 yrs sober  Mood stable  GERD requires PPI compliance  Anxiety good with PRN hydroxyzine    Past Medical History:   Diagnosis Date    ADHD (attention deficit hyperactivity disorder)     Alcohol abuse     Alcoholism     Anxiety     Depression     Wrist fracture     right     Past Surgical History:   Procedure Laterality Date    DENTAL PROCEDURE       Current Outpatient Medications   Medication Instructions    buPROPion XL (WELLBUTRIN XL) 150 mg, Oral, Every Morning    dicyclomine (BENTYL) 10 mg, Oral, 3 Times Daily PRN    hydrOXYzine pamoate (VISTARIL) 25 mg, Oral, 4 Times Daily PRN    omeprazole (PRILOSEC) 40 mg, Oral, Daily    traZODone (DESYREL) 100 mg, Oral, Nightly    ubrogepant (UBRELVY) 100 mg, Oral, Once As Needed     Allergies   Allergen Reactions    Prozac [Fluoxetine] Other (See Comments)     agitation     Family History   Problem Relation Age of Onset    Alcohol abuse Maternal Uncle     Alcohol abuse Maternal Grandmother     Heart attack Maternal Grandmother     Diabetes Paternal Grandmother     Alcohol abuse Cousin     Depression Father     Suicide Attempts Maternal Uncle         This was way befofe i was born     Social History     Tobacco Use    Smoking status: Every Day     Types: Electronic Cigarette    Smokeless tobacco: Never   Vaping Use    Vaping status: Former   Substance Use Topics    Alcohol use: Not Currently     Comment: previously heavy use    Drug use: Not Currently     Frequency: 7.0 times per week     Types: Benzodiazepines, Cocaine(coke), Hashish, Ketamine, Marijuana, MDMA (ecstacy)       Objective   Vital Signs:  /80 (BP Location: Right arm, Patient Position: Sitting, Cuff Size: Adult)   Pulse 97   Temp 97 °F (36.1 °C) (Temporal)   Ht 167.6 cm (66\")   Wt " "92.4 kg (203 lb 9.6 oz)   SpO2 98%   BMI 32.86 kg/m²   Estimated body mass index is 32.86 kg/m² as calculated from the following:    Height as of this encounter: 167.6 cm (66\").    Weight as of this encounter: 92.4 kg (203 lb 9.6 oz).    BMI is >= 30 and <35. (Class 1 Obesity). The following options were offered after discussion;: exercise counseling/recommendations and nutrition counseling/recommendations      Physical Exam  Constitutional:       General: He is not in acute distress.     Appearance: Normal appearance. He is not ill-appearing or diaphoretic.   HENT:      Head: Normocephalic and atraumatic.      Right Ear: Tympanic membrane and external ear normal.      Left Ear: Tympanic membrane and external ear normal.      Nose: Nose normal. No congestion or rhinorrhea.      Mouth/Throat:      Mouth: Mucous membranes are moist.      Pharynx: Oropharynx is clear. No oropharyngeal exudate or posterior oropharyngeal erythema.   Eyes:      General: No scleral icterus.        Right eye: No discharge.         Left eye: No discharge.      Extraocular Movements: Extraocular movements intact.      Conjunctiva/sclera: Conjunctivae normal.      Pupils: Pupils are equal, round, and reactive to light.   Neck:      Thyroid: No thyromegaly.      Vascular: No JVD.   Cardiovascular:      Rate and Rhythm: Normal rate and regular rhythm.      Pulses: Normal pulses.      Heart sounds: Normal heart sounds. No murmur heard.     No friction rub. No gallop.   Pulmonary:      Effort: Pulmonary effort is normal.      Breath sounds: Normal breath sounds.   Abdominal:      General: Bowel sounds are normal. There is no distension.      Palpations: Abdomen is soft. There is no mass.      Tenderness: There is no abdominal tenderness. There is no guarding or rebound.   Musculoskeletal:         General: No deformity or signs of injury.      Cervical back: Neck supple.      Right lower leg: No edema.      Left lower leg: No edema. "   Lymphadenopathy:      Cervical: No cervical adenopathy.   Skin:     General: Skin is warm and dry.      Capillary Refill: Capillary refill takes less than 2 seconds.      Coloration: Skin is not jaundiced or pale.   Neurological:      Mental Status: He is alert and oriented to person, place, and time. Mental status is at baseline.   Psychiatric:         Mood and Affect: Mood normal.         Behavior: Behavior normal.         Thought Content: Thought content normal.         Judgment: Judgment normal.        Result Review :                Assessment and Plan   Diagnoses and all orders for this visit:    1. Annual physical exam (Primary)    2. ARMIDA (generalized anxiety disorder)    3. Obesity (BMI 30.0-34.9)  -     Comprehensive Metabolic Panel; Future  -     Hemoglobin A1c; Future  -     Lipid panel; Future    4. Insomnia, unspecified type    5. Gastroesophageal reflux disease, unspecified whether esophagitis present      Continue PPI and trazodone  Preventative: awaiting insurance, will order labs when coverage is present  F/u yearly

## 2025-04-25 DIAGNOSIS — G47.00 INSOMNIA, UNSPECIFIED TYPE: ICD-10-CM

## 2025-04-25 RX ORDER — TRAZODONE HYDROCHLORIDE 100 MG/1
100 TABLET ORAL NIGHTLY
Qty: 90 TABLET | Refills: 2 | Status: SHIPPED | OUTPATIENT
Start: 2025-04-25

## 2025-06-02 DIAGNOSIS — K21.9 GASTROESOPHAGEAL REFLUX DISEASE, UNSPECIFIED WHETHER ESOPHAGITIS PRESENT: ICD-10-CM

## 2025-06-02 RX ORDER — OMEPRAZOLE 40 MG/1
40 CAPSULE, DELAYED RELEASE ORAL DAILY
Qty: 90 CAPSULE | Refills: 1 | Status: SHIPPED | OUTPATIENT
Start: 2025-06-02